# Patient Record
Sex: MALE | Race: WHITE | NOT HISPANIC OR LATINO | Employment: FULL TIME | URBAN - METROPOLITAN AREA
[De-identification: names, ages, dates, MRNs, and addresses within clinical notes are randomized per-mention and may not be internally consistent; named-entity substitution may affect disease eponyms.]

---
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
## 2020-01-07 ENCOUNTER — OFFICE VISIT - HEALTHEAST (OUTPATIENT)
Dept: FAMILY MEDICINE | Facility: CLINIC | Age: 20
End: 2020-01-07

## 2020-01-07 DIAGNOSIS — F41.1 GAD (GENERALIZED ANXIETY DISORDER): ICD-10-CM

## 2020-01-07 ASSESSMENT — ANXIETY QUESTIONNAIRES
7. FEELING AFRAID AS IF SOMETHING AWFUL MIGHT HAPPEN: SEVERAL DAYS
1. FEELING NERVOUS, ANXIOUS, OR ON EDGE: MORE THAN HALF THE DAYS
IF YOU CHECKED OFF ANY PROBLEMS ON THIS QUESTIONNAIRE, HOW DIFFICULT HAVE THESE PROBLEMS MADE IT FOR YOU TO DO YOUR WORK, TAKE CARE OF THINGS AT HOME, OR GET ALONG WITH OTHER PEOPLE: SOMEWHAT DIFFICULT
3. WORRYING TOO MUCH ABOUT DIFFERENT THINGS: MORE THAN HALF THE DAYS
2. NOT BEING ABLE TO STOP OR CONTROL WORRYING: MORE THAN HALF THE DAYS
4. TROUBLE RELAXING: MORE THAN HALF THE DAYS
5. BEING SO RESTLESS THAT IT IS HARD TO SIT STILL: MORE THAN HALF THE DAYS
6. BECOMING EASILY ANNOYED OR IRRITABLE: SEVERAL DAYS
GAD7 TOTAL SCORE: 12

## 2020-01-07 ASSESSMENT — PATIENT HEALTH QUESTIONNAIRE - PHQ9: SUM OF ALL RESPONSES TO PHQ QUESTIONS 1-9: 7

## 2020-01-20 ENCOUNTER — OFFICE VISIT - HEALTHEAST (OUTPATIENT)
Dept: FAMILY MEDICINE | Facility: CLINIC | Age: 20
End: 2020-01-20

## 2020-01-20 DIAGNOSIS — F43.23 ADJUSTMENT DISORDER WITH MIXED ANXIETY AND DEPRESSED MOOD: ICD-10-CM

## 2020-01-20 ASSESSMENT — ANXIETY QUESTIONNAIRES
7. FEELING AFRAID AS IF SOMETHING AWFUL MIGHT HAPPEN: NOT AT ALL
IF YOU CHECKED OFF ANY PROBLEMS ON THIS QUESTIONNAIRE, HOW DIFFICULT HAVE THESE PROBLEMS MADE IT FOR YOU TO DO YOUR WORK, TAKE CARE OF THINGS AT HOME, OR GET ALONG WITH OTHER PEOPLE: NOT DIFFICULT AT ALL
6. BECOMING EASILY ANNOYED OR IRRITABLE: NOT AT ALL
GAD7 TOTAL SCORE: 1
3. WORRYING TOO MUCH ABOUT DIFFERENT THINGS: NOT AT ALL
4. TROUBLE RELAXING: NOT AT ALL
5. BEING SO RESTLESS THAT IT IS HARD TO SIT STILL: NOT AT ALL
2. NOT BEING ABLE TO STOP OR CONTROL WORRYING: NOT AT ALL
1. FEELING NERVOUS, ANXIOUS, OR ON EDGE: SEVERAL DAYS

## 2020-01-20 ASSESSMENT — PATIENT HEALTH QUESTIONNAIRE - PHQ9: SUM OF ALL RESPONSES TO PHQ QUESTIONS 1-9: 1

## 2020-01-30 ENCOUNTER — COMMUNICATION - HEALTHEAST (OUTPATIENT)
Dept: FAMILY MEDICINE | Facility: CLINIC | Age: 20
End: 2020-01-30

## 2020-01-30 DIAGNOSIS — F41.1 GAD (GENERALIZED ANXIETY DISORDER): ICD-10-CM

## 2020-02-07 ENCOUNTER — COMMUNICATION - HEALTHEAST (OUTPATIENT)
Dept: FAMILY MEDICINE | Facility: CLINIC | Age: 20
End: 2020-02-07

## 2020-02-07 DIAGNOSIS — F41.1 GAD (GENERALIZED ANXIETY DISORDER): ICD-10-CM

## 2020-02-26 ENCOUNTER — COMMUNICATION - HEALTHEAST (OUTPATIENT)
Dept: FAMILY MEDICINE | Facility: CLINIC | Age: 20
End: 2020-02-26

## 2020-03-03 ENCOUNTER — OFFICE VISIT - HEALTHEAST (OUTPATIENT)
Dept: FAMILY MEDICINE | Facility: CLINIC | Age: 20
End: 2020-03-03

## 2020-03-03 DIAGNOSIS — J02.0 STREP PHARYNGITIS: ICD-10-CM

## 2020-03-03 LAB — DEPRECATED S PYO AG THROAT QL EIA: ABNORMAL

## 2020-05-11 ENCOUNTER — OFFICE VISIT - HEALTHEAST (OUTPATIENT)
Dept: FAMILY MEDICINE | Facility: CLINIC | Age: 20
End: 2020-05-11

## 2020-05-11 DIAGNOSIS — F43.22 ADJUSTMENT DISORDER WITH ANXIOUS MOOD: ICD-10-CM

## 2020-05-11 ASSESSMENT — ANXIETY QUESTIONNAIRES
3. WORRYING TOO MUCH ABOUT DIFFERENT THINGS: NOT AT ALL
1. FEELING NERVOUS, ANXIOUS, OR ON EDGE: NOT AT ALL
5. BEING SO RESTLESS THAT IT IS HARD TO SIT STILL: NOT AT ALL
7. FEELING AFRAID AS IF SOMETHING AWFUL MIGHT HAPPEN: NOT AT ALL
2. NOT BEING ABLE TO STOP OR CONTROL WORRYING: NOT AT ALL
4. TROUBLE RELAXING: NOT AT ALL
6. BECOMING EASILY ANNOYED OR IRRITABLE: NOT AT ALL
IF YOU CHECKED OFF ANY PROBLEMS ON THIS QUESTIONNAIRE, HOW DIFFICULT HAVE THESE PROBLEMS MADE IT FOR YOU TO DO YOUR WORK, TAKE CARE OF THINGS AT HOME, OR GET ALONG WITH OTHER PEOPLE: NOT DIFFICULT AT ALL
GAD7 TOTAL SCORE: 0

## 2020-05-11 ASSESSMENT — PATIENT HEALTH QUESTIONNAIRE - PHQ9: SUM OF ALL RESPONSES TO PHQ QUESTIONS 1-9: 0

## 2020-08-31 ENCOUNTER — OFFICE VISIT - HEALTHEAST (OUTPATIENT)
Dept: FAMILY MEDICINE | Facility: CLINIC | Age: 20
End: 2020-08-31

## 2020-08-31 ENCOUNTER — COMMUNICATION - HEALTHEAST (OUTPATIENT)
Dept: FAMILY MEDICINE | Facility: CLINIC | Age: 20
End: 2020-08-31

## 2020-08-31 DIAGNOSIS — Z00.00 ENCOUNTER FOR GENERAL ADULT MEDICAL EXAMINATION WITHOUT ABNORMAL FINDINGS: ICD-10-CM

## 2020-08-31 DIAGNOSIS — Z13.1 ENCOUNTER FOR SCREENING FOR DIABETES MELLITUS: ICD-10-CM

## 2020-08-31 DIAGNOSIS — Z13.220 ENCOUNTER FOR SCREENING FOR LIPOID DISORDERS: ICD-10-CM

## 2020-08-31 LAB
CHOLEST SERPL-MCNC: 179 MG/DL
FASTING STATUS PATIENT QL REPORTED: YES
FASTING STATUS PATIENT QL REPORTED: YES
GLUCOSE BLD-MCNC: 107 MG/DL (ref 70–125)
HDLC SERPL-MCNC: 50 MG/DL
HGB BLD-MCNC: 16 G/DL (ref 14–18)
LDLC SERPL CALC-MCNC: 118 MG/DL
TRIGL SERPL-MCNC: 53 MG/DL

## 2020-08-31 ASSESSMENT — MIFFLIN-ST. JEOR: SCORE: 1832.49

## 2020-11-20 ENCOUNTER — OFFICE VISIT - HEALTHEAST (OUTPATIENT)
Dept: FAMILY MEDICINE | Facility: CLINIC | Age: 20
End: 2020-11-20

## 2020-11-20 ENCOUNTER — COMMUNICATION - HEALTHEAST (OUTPATIENT)
Dept: FAMILY MEDICINE | Facility: CLINIC | Age: 20
End: 2020-11-20

## 2020-11-20 DIAGNOSIS — J02.9 ACUTE PHARYNGITIS, UNSPECIFIED ETIOLOGY: ICD-10-CM

## 2021-01-29 ENCOUNTER — OFFICE VISIT - HEALTHEAST (OUTPATIENT)
Dept: FAMILY MEDICINE | Facility: CLINIC | Age: 21
End: 2021-01-29

## 2021-01-29 DIAGNOSIS — Z11.3 SCREEN FOR STD (SEXUALLY TRANSMITTED DISEASE): ICD-10-CM

## 2021-01-29 ASSESSMENT — MIFFLIN-ST. JEOR: SCORE: 1831.69

## 2021-02-02 ENCOUNTER — COMMUNICATION - HEALTHEAST (OUTPATIENT)
Dept: FAMILY MEDICINE | Facility: CLINIC | Age: 21
End: 2021-02-02

## 2021-02-02 LAB
C TRACH DNA SPEC QL PROBE+SIG AMP: POSITIVE
N GONORRHOEA DNA SPEC QL NAA+PROBE: NEGATIVE

## 2021-05-26 ASSESSMENT — PATIENT HEALTH QUESTIONNAIRE - PHQ9: SUM OF ALL RESPONSES TO PHQ QUESTIONS 1-9: 1

## 2021-05-27 VITALS — OXYGEN SATURATION: 100 % | SYSTOLIC BLOOD PRESSURE: 118 MMHG | DIASTOLIC BLOOD PRESSURE: 55 MMHG | HEART RATE: 64 BPM

## 2021-05-27 ASSESSMENT — PATIENT HEALTH QUESTIONNAIRE - PHQ9
SUM OF ALL RESPONSES TO PHQ QUESTIONS 1-9: 0
SUM OF ALL RESPONSES TO PHQ QUESTIONS 1-9: 7

## 2021-05-28 ASSESSMENT — ANXIETY QUESTIONNAIRES
GAD7 TOTAL SCORE: 1
GAD7 TOTAL SCORE: 0
GAD7 TOTAL SCORE: 12

## 2021-05-29 NOTE — PROGRESS NOTES
Attestation:  I Shanti Lagunas DNP, performed a history and physical examination of the patient and discussed management with the NP student. I reviewed the NP student s note and agree with the documented findings and plan of care.     Assessment/Plan:        1. Viral upper respiratory tract infection  Recommend restarting OTC zyrtec  Continue using Flonase nasal spray for 2 weeks  Discussed red flags that would warrant urgent evaluation.   Discussed with patient to follow up if worsening symptoms, questions or concerns  Patient agreed and appeared pleased with plan        Subjective:    Patient ID: Greg De Leon is a 19 y.o. male.    Patient presents with nasal congestion which started 1 month ago.  Patient does have history of seasonal allergies.  Patient endorses nasal congestion and post-nasal gtt.  Denies sore throat, cough, headache, chest pain, shortness of breath, sinus pressure, nausea, diarrhea, and fever. Patient states he has not taken his zyrtec but has been using a nasal spray for the last few weeks.       The following portions of the patient's history were reviewed and updated as appropriate: allergies, current medications, past family history, past medical history, past social history, past surgical history and problem list.    Review of Systems   Constitutional: Negative.  Negative for fatigue and fever.   HENT: Positive for congestion and postnasal drip. Negative for ear pain, sinus pressure, sinus pain and sore throat.    Eyes: Negative.    Respiratory: Negative.  Negative for cough, shortness of breath and wheezing.    Cardiovascular: Negative.  Negative for chest pain.   Gastrointestinal: Negative.    Genitourinary: Negative.    Musculoskeletal: Negative.    Neurological: Negative.  Negative for headaches.   Psychiatric/Behavioral: Negative.            Current Outpatient Medications   Medication Sig Dispense Refill     calcium-vitamin D (CALCIUM-VITAMIN D) 500 mg(1,250mg) -200 unit per  tablet Take 1 tablet by mouth 2 (two) times a day with meals.       cetirizine (ZYRTEC) 10 MG tablet Take 10 mg by mouth daily.       MAGNESIUM CITRATE ORAL Take 200 mg by mouth.       multivitamin therapeutic (THERAGRAN) tablet Take 1 tablet by mouth daily.       No current facility-administered medications for this visit.          Objective:    Physical Exam   Constitutional: He is oriented to person, place, and time. He appears well-developed and well-nourished.   HENT:   Head: Normocephalic and atraumatic.   Right Ear: Hearing, tympanic membrane and ear canal normal.   Left Ear: Hearing, tympanic membrane, external ear and ear canal normal.   Nose: Mucosal edema and rhinorrhea present.   Mouth/Throat: Uvula is midline, oropharynx is clear and moist and mucous membranes are normal.   Eyes: Pupils are equal, round, and reactive to light. Conjunctivae are normal.   Neck: Normal range of motion.   Cardiovascular: Normal rate, regular rhythm, S1 normal and S2 normal.   Pulmonary/Chest: Effort normal and breath sounds normal. He has no wheezes.   Abdominal: Soft.   Musculoskeletal: Normal range of motion.   Neurological: He is alert and oriented to person, place, and time.   Skin: Skin is warm and dry.   Psychiatric: He has a normal mood and affect. His behavior is normal.         /63   Pulse (!) 54   Wt 170 lb 6 oz (77.3 kg)   SpO2 99%   BMI 25.16 kg/m

## 2021-05-30 ENCOUNTER — RECORDS - HEALTHEAST (OUTPATIENT)
Dept: ADMINISTRATIVE | Facility: CLINIC | Age: 21
End: 2021-05-30

## 2021-05-30 VITALS — BODY MASS INDEX: 24.79 KG/M2 | WEIGHT: 167.4 LBS | HEIGHT: 69 IN

## 2021-05-30 NOTE — PROGRESS NOTES
ASSESSMENT:  1. Encounter for screening for HIV  - HIV Antigen/Antibody Screening [facility]    2. Gastroesophageal reflux disease without esophagitis  - ranitidine (ZANTAC) 150 MG tablet; Take 1 tablet (150 mg total) by mouth 2 (two) times a day.  Dispense: 30 tablet; Refill: 1  I unfortunately do not have the lab test that was done for him for the STD screening.  But he did mention that it is normal.  I will recheck his HIV studies and inform him of his labs.  I did also I informed him that I think that the pain that he has on his neck as well as the upper chest appears to be reflux.  I will go ahead and have him treated with ranitidine as noted and let us know if there is no improvement or how he is doing.    PLAN:  There are no Patient Instructions on file for this visit.    Orders Placed This Encounter   Procedures     HIV Antigen/Antibody Screening [facility]     There are no discontinued medications.    No follow-ups on file.      CHIEF COMPLAINT:  Chief Complaint   Patient presents with     Lab Result Follow Up     Pain     front of neck down to chest x 1 day        HISTORY OF PRESENT ILLNESS:  Greg is a 19 y.o. male presenting to the clinic today for follow-up.  He was in [place] over the spring break, and noted having had some sexual exposures.  He had been tested for STDs around that time and was advised to have a recheck of his HIV 2 months later.  He noted having been negative in all of the other STD screening parameters.  Denied any prior STD.  He is also having some pain noted around the epigastric region with some extension into the 12th.  This was noted to have started today.  He noted not having that in the past.  Denies any prior gastroesophageal reflux.  He does smoke intermittently but not a lot.  Does not have any nausea no vomiting.    REVIEW OF SYSTEMS:   All other systems are negative.    PFSH:  Reviewed, as below.    Social History     Tobacco Use   Smoking Status Current Every Day Smoker    Smokeless Tobacco Never Used       Family History   Problem Relation Age of Onset     Breast cancer Mother      Colon cancer Paternal Grandmother        Social History     Socioeconomic History     Marital status: Single     Spouse name: Not on file     Number of children: Not on file     Years of education: Not on file     Highest education level: Not on file   Occupational History     Not on file   Social Needs     Financial resource strain: Not on file     Food insecurity:     Worry: Not on file     Inability: Not on file     Transportation needs:     Medical: Not on file     Non-medical: Not on file   Tobacco Use     Smoking status: Current Every Day Smoker     Smokeless tobacco: Never Used   Substance and Sexual Activity     Alcohol use: No     Drug use: No     Sexual activity: Never   Lifestyle     Physical activity:     Days per week: Not on file     Minutes per session: Not on file     Stress: Not on file   Relationships     Social connections:     Talks on phone: Not on file     Gets together: Not on file     Attends [employer] service: Not on file     Active member of club or organization: Not on file     Attends meetings of clubs or organizations: Not on file     Relationship status: Not on file     Intimate partner violence:     Fear of current or ex partner: Not on file     Emotionally abused: Not on file     Physically abused: Not on file     Forced sexual activity: Not on file   Other Topics Concern     Not on file   Social History Narrative    Lives with parents, older brother: Molina, and younger brother: Ismael.        No past surgical history on file.    No Known Allergies    Active Ambulatory Problems     Diagnosis Date Noted     Acne 07/17/2015     Epistaxis, recurrent 07/17/2015     Resolved Ambulatory Problems     Diagnosis Date Noted     Acute pharyngitis      Attention deficit hyperactivity disorder (ADHD)      No Additional Past Medical History       Current Outpatient Medications  "  Medication Sig Dispense Refill     calcium-vitamin D (CALCIUM-VITAMIN D) 500 mg(1,250mg) -200 unit per tablet Take 1 tablet by mouth 2 (two) times a day with meals.       cetirizine (ZYRTEC) 10 MG tablet Take 10 mg by mouth daily.       MAGNESIUM CITRATE ORAL Take 200 mg by mouth.       multivitamin therapeutic (THERAGRAN) tablet Take 1 tablet by mouth daily.       ranitidine (ZANTAC) 150 MG tablet Take 1 tablet (150 mg total) by mouth 2 (two) times a day. 30 tablet 1     No current facility-administered medications for this visit.        VITALS:  Vitals:    07/08/19 1345   BP: 108/60   Pulse: (!) 55   SpO2: 99%   Weight: 173 lb 4 oz (78.6 kg)   Height: 5' 9\" (1.753 m)     Wt Readings from Last 3 Encounters:   07/08/19 173 lb 4 oz (78.6 kg) (77 %, Z= 0.73)*   05/30/19 170 lb 6 oz (77.3 kg) (74 %, Z= 0.65)*   08/22/18 177 lb 3.2 oz (80.4 kg) (83 %, Z= 0.96)*     * Growth percentiles are based on CDC (Boys, 2-20 Years) data.     Body mass index is 25.58 kg/m .    PHYSICAL EXAM:  General Appearance: Alert, cooperative, no distress, appears stated age  HEENT: Pupils are equal and reactive, extraocular motions is normal.  Oropharynx is moist.  No notable cobblestoning.  Neck is supple no notable thyromegaly.  External ears are normal.  Lungs: Clear to auscultation bilaterally, respirations unlabored  Heart: Regular rate and rhythm, S1 and S2 normal.  Abdomen: Soft, he does not have any tenderness.  No organs were palpable.  Musculoskeletal: Normal range of motion. No joint swelling or deformity.   Neurologic:  Alert and oriented times 3.   Psychiatric: Normal mood and affect.    MEDICATIONS:  Current Outpatient Medications   Medication Sig Dispense Refill     calcium-vitamin D (CALCIUM-VITAMIN D) 500 mg(1,250mg) -200 unit per tablet Take 1 tablet by mouth 2 (two) times a day with meals.       cetirizine (ZYRTEC) 10 MG tablet Take 10 mg by mouth daily.       MAGNESIUM CITRATE ORAL Take 200 mg by mouth.       " multivitamin therapeutic (THERAGRAN) tablet Take 1 tablet by mouth daily.       ranitidine (ZANTAC) 150 MG tablet Take 1 tablet (150 mg total) by mouth 2 (two) times a day. 30 tablet 1     No current facility-administered medications for this visit.

## 2021-05-31 VITALS — BODY MASS INDEX: 26.5 KG/M2 | WEIGHT: 178.9 LBS | HEIGHT: 69 IN

## 2021-05-31 NOTE — PROGRESS NOTES
Assessment:       1. Strep pharyngitis  Rapid Strep A Screen-Throat    Group A Strep, RNA Direct Detection, Throat    Mononucleosis Screen    penicillin VK (PEN VK) 500 MG tablet     Medical Decision Making  Patient presents with throat pain most consistent with strep pharyngitis despite negative rapid strep. Also tested for suspected mononucleosis given 1 week of symptom without improvement, but monospot was negative.      Plan:       Patient placed on antibiotics.   Probiotics.  Change toothbrush.  OTC analgesics.  Salt water gargles, throat lozenges, warm tea with honey PRN.  Discussed signs of worsening symptoms and when to follow-up with PCP if no symptom improvement.      Patient Instructions   Your rapid strep test was positive today. We will treat with a course of antibiotics. Please complete the full course of antibiotics. You can take your medication with food and with a probiotic such as Culturelle to prevent stomach irritation. You will be contagious for 24 hours following initiation of the medication.    You may use Tylenol or Motrin for pain and fevers.    May drink warm tea, gargle saline solution, or use throat lozenges to sooth throat pain.    Change toothbrush after 48 hours of starting the antibiotic to prevent reinfection.    Watch for resolution of symptoms in the next few days. If you continue to have high fevers, begin to have difficulty swallowing or breathing, notice worsening neck pain, or difficulty moving neck, please return to clinic or present to the emergency room immediately. Otherwise, follow up with your primary care provider as needed.      Subjective:        History was provided by the patient and mother.  Greg De Leon is a 19 y.o. male who presents for evaluation of a sore throat. Associated symptoms include fatigue and headaches. Onset of symptoms was 1 week ago, unchanged since that time.  He is drinking plenty of fluids. He has not had recent close exposure to someone with  proven streptococcal pharyngitis. Patient denies cough, vomiting, an diarrhea. Takes ibuprofen which helps with the headaches.    The following portions of the patient's history were reviewed and updated as appropriate: allergies, current medications and problem list.    Review of Systems  Pertinent items are noted in HPI.    Allergies  No Known Allergies      Objective:       /65 (Patient Site: Right Arm, Patient Position: Sitting, Cuff Size: Adult Regular)   Pulse 82   Temp 98.7  F (37.1  C) (Oral)   Resp 12   Wt 181 lb 14.4 oz (82.5 kg)   SpO2 97%   BMI 26.86 kg/m    General appearance: alert, appears stated age, cooperative, no distress and non-toxic  Head: Normocephalic, without obvious abnormality, atraumatic  Ears: TM's intact with mild mucoid fluid and bulginb, no eryhema; external ears normal  Nose: no discharge  Throat: moderate tonsils welling with severe erythema, no exudate; MMM, lips and tongue normal  Neck: mild anterior cervical adenopathy and supple, symmetrical, trachea midline  Lungs: clear to auscultation bilaterally and no rhonchi, rales, or wheezing  Heart: regular rate and rhythm, S1, S2 normal, no murmur, click, rub or gallop    Lab Results    Recent Results (from the past 24 hour(s))   Rapid Strep A Screen-Throat   Result Value Ref Range    Rapid Strep A Antigen No Group A Strep detected, presumptive negative No Group A Strep detected, presumptive negative   Mononucleosis Screen   Result Value Ref Range    Mono Screen Negative Negative     I personally reviewed these results and discussed findings with the patient.

## 2021-05-31 NOTE — PATIENT INSTRUCTIONS - HE
Your rapid strep test was positive today. We will treat with a course of antibiotics. Please complete the full course of antibiotics. You can take your medication with food and with a probiotic such as Culturelle to prevent stomach irritation. You will be contagious for 24 hours following initiation of the medication.    You may use Tylenol or Motrin for pain and fevers.    May drink warm tea, gargle saline solution, or use throat lozenges to sooth throat pain.    Change toothbrush after 48 hours of starting the antibiotic to prevent reinfection.    Watch for resolution of symptoms in the next few days. If you continue to have high fevers, begin to have difficulty swallowing or breathing, notice worsening neck pain, or difficulty moving neck, please return to clinic or present to the emergency room immediately. Otherwise, follow up with your primary care provider as needed.

## 2021-06-01 VITALS — HEIGHT: 69 IN | WEIGHT: 177.2 LBS | BODY MASS INDEX: 26.25 KG/M2

## 2021-06-03 VITALS — HEIGHT: 69 IN | WEIGHT: 182.19 LBS | BODY MASS INDEX: 26.98 KG/M2

## 2021-06-03 VITALS — WEIGHT: 173.25 LBS | HEIGHT: 69 IN | BODY MASS INDEX: 25.66 KG/M2

## 2021-06-03 VITALS — WEIGHT: 181.9 LBS | BODY MASS INDEX: 26.86 KG/M2

## 2021-06-03 VITALS — WEIGHT: 170.38 LBS | BODY MASS INDEX: 25.16 KG/M2

## 2021-06-04 VITALS — WEIGHT: 175 LBS | BODY MASS INDEX: 25.66 KG/M2

## 2021-06-04 VITALS
DIASTOLIC BLOOD PRESSURE: 72 MMHG | BODY MASS INDEX: 26.01 KG/M2 | HEART RATE: 62 BPM | WEIGHT: 177.44 LBS | OXYGEN SATURATION: 98 % | SYSTOLIC BLOOD PRESSURE: 126 MMHG

## 2021-06-04 VITALS
TEMPERATURE: 97.9 F | OXYGEN SATURATION: 97 % | BODY MASS INDEX: 25.51 KG/M2 | DIASTOLIC BLOOD PRESSURE: 64 MMHG | WEIGHT: 174 LBS | SYSTOLIC BLOOD PRESSURE: 114 MMHG | HEART RATE: 74 BPM

## 2021-06-04 VITALS
DIASTOLIC BLOOD PRESSURE: 74 MMHG | SYSTOLIC BLOOD PRESSURE: 124 MMHG | OXYGEN SATURATION: 99 % | BODY MASS INDEX: 26.17 KG/M2 | WEIGHT: 182.8 LBS | HEART RATE: 71 BPM | HEIGHT: 70 IN

## 2021-06-05 VITALS
OXYGEN SATURATION: 99 % | HEIGHT: 70 IN | WEIGHT: 180 LBS | HEART RATE: 59 BPM | DIASTOLIC BLOOD PRESSURE: 75 MMHG | BODY MASS INDEX: 25.77 KG/M2 | SYSTOLIC BLOOD PRESSURE: 133 MMHG | TEMPERATURE: 97.8 F

## 2021-06-05 NOTE — PROGRESS NOTES
Assessment:   1. SYDNEY (generalized anxiety disorder)  Discussed diagnosis with patient and his mother and possible treatment options. Recommend initiating treatment with SSRI and answered all questions.   - sertraline (ZOLOFT) 25 MG tablet; Take 1 tablet (25 mg total) by mouth daily.  Dispense: 7 tablet; Refill: 0  - sertraline (ZOLOFT) 50 MG tablet; Take 1 tablet (50 mg total) by mouth daily.  Dispense: 30 tablet; Refill: 0    Plan:   Medications: Zoloft.  Labs: no labs indicated at this time.  Recommended counseling.  Handouts describing disease, natural history, and treatment were not given to the patient.  Reviewed concept of anxiety as biochemical imbalance of neurotransmitters and rationale for treatment.  Instructed patient to contact office or on-call physician promptly should condition worsen or any new symptoms appear and provided on-call telephone numbers. IF THE PATIENT HAS ANY SUICIDAL OR HOMICIDAL IDEATIONS, CALL THE OFFICE, DISCUSS WITH A SUPPORT MEMBER, OR GO TO THE ER IMMEDIATELY. Patient was agreeable with this plan.  Follow up: 2 weeks.  Spent 25 minutes (>50% of visit) discussing the risks of anxiety disorder, the  pathophysiology, etiology, risks, and principles of treatment.     Subjective:   Greg De Leon is a 19 y.o. male who presents for new evaluation and treatment of anxiety disorder. He has the following anxiety symptoms: chest pain, difficulty concentrating, insomnia, panic attacks and racing thoughts. Onset of symptoms was approximately 3 months ago. Symptoms have been gradually worsening since that time. He denies current suicidal and homicidal ideation. Family history significant for anxiety and depression. Risk factors: positive family history in  brother(s) and mother. Previous treatment includes none.     The following portions of the patient's history were reviewed and updated as appropriate: allergies, current medications, past family history, past medical history, past social  history, past surgical history and problem list.    Review of Systems  A 12 point comprehensive review of systems was negative except as noted.      Objective:   /55   Pulse 64   SpO2 100%   General appearance: alert, appears stated age and cooperative  Head: Normocephalic, without obvious abnormality, atraumatic  Pulses: 2+ and symmetric  Skin: Skin color, texture, turgor normal. No rashes or lesions  Lymph nodes: Cervical, supraclavicular, and axillary nodes normal.  Neurologic: Grossly normal

## 2021-06-05 NOTE — TELEPHONE ENCOUNTER
RN cannot approve Refill Request    RN can NOT refill this medication Protocol failed and NO refill given and medication not on med list.       Betty Blanco, [facility] Triage/Med Refill 1/30/2020    Requested Prescriptions   Pending Prescriptions Disp Refills     sertraline (ZOLOFT) 50 MG tablet [Pharmacy Med Name: SERTRALINE HCL 50 MG TABLET] 30 tablet 0     Sig: TAKE 1 TABLET BY MOUTH EVERY DAY (START AFTER 7 DAYS OF 25 MG DAILY)       SSRI Refill Protocol  Failed - 1/30/2020  8:33 AM        Failed - Age 21 and younger route to prescribing provider     Last office visit with prescriber/PCP: 1/20/2020 Shanti Lagunas, [facility] same dept: 1/20/2020 Shanti Lagunas, [facility] OR same specialty: 1/20/2020 Shanti Lagunas, [facility]  Last physical: 8/22/2019 Last MTM visit: Visit date not found   Next visit within 3 mo: Visit date not found  Next physical within 3 mo: Visit date not found  Prescriber OR PCP: PENELOPE Miner  Last diagnosis associated with med order: 1. SYDNEY (generalized anxiety disorder)  - sertraline (ZOLOFT) 50 MG tablet [Pharmacy Med Name: SERTRALINE HCL 50 MG TABLET]; TAKE 1 TABLET BY MOUTH EVERY DAY (START AFTER 7 DAYS OF 25 MG DAILY)  Dispense: 30 tablet; Refill: 0    If protocol passes may refill for 12 months if within 3 months of last provider visit (or a total of 15 months).             Passed - PCP or prescribing provider visit in last year     Last office visit with prescriber/PCP: 1/20/2020 Shanti Lagunas, [facility] same dept: 1/20/2020 Shanti Lagunas, [facility] OR same specialty: 1/20/2020 Shanti Lagunas, [facility]  Last physical: 8/22/2019 Last MTM visit: Visit date not found   Next visit within 3 mo: Visit date not found  Next physical within 3 mo: Visit date not found  Prescriber OR PCP: PENELOPE Miner  Last diagnosis associated with med order: 1. SYDNEY (generalized anxiety disorder)  - sertraline (ZOLOFT) 50 MG tablet [Pharmacy Med Name: SERTRALINE HCL 50 MG TABLET]; TAKE 1  TABLET BY MOUTH EVERY DAY (START AFTER 7 DAYS OF 25 MG DAILY)  Dispense: 30 tablet; Refill: 0    If protocol passes may refill for 12 months if within 3 months of last provider visit (or a total of 15 months).

## 2021-06-05 NOTE — PROGRESS NOTES
Assessment:   1. Adjustment disorder with mixed anxiety and depressed mood  Stable with current medication. Continue on Zoloft 25 mg daily       Plan:   Medications: Zoloft.  Reviewed concept of anxiety as biochemical imbalance of neurotransmitters and rationale for treatment.  Instructed patient to contact office or on-call physician promptly should condition worsen or any new symptoms appear and provided on-call telephone numbers. IF THE PATIENT HAS ANY SUICIDAL OR HOMICIDAL IDEATIONS, CALL THE OFFICE, DISCUSS WITH A SUPPORT MEMBER, OR GO TO THE ER IMMEDIATELY. Patient was agreeable with this plan.  Follow up: 4 weeks.  Spent 15 minutes (>50% of visit) discussing the risks of anxiety disorder, the  pathophysiology, etiology, risks, and principles of treatment.     Subjective:   Greg De Leon is a 19 y.o. male who presents for follow up of depression with his mother. He reports that he is doing much better with the medication. He denied any adverse effect. He is currently taking 25 mg of zoloft. Symptoms have been stable since that time. Patient denies anhedonia, depressed mood, difficulty concentrating, hopelessness, hypersomnia, insomnia, psychomotor agitation, recurrent thoughts of death, suicidal attempt, suicidal thoughts with specific plan, suicidal thoughts without plan, weight gain and weight loss. He denies side effects from the treatment:    The following portions of the patient's history were reviewed and updated as appropriate: allergies, current medications, past family history, past medical history, past social history, past surgical history and problem list.    Review of Systems  A 12 point comprehensive review of systems was negative except as noted.      Objective:      /72   Pulse 62   Wt 177 lb 7 oz (80.5 kg)   SpO2 98%   BMI 26.01 kg/m     General:  alert, appears stated age and cooperative   Affect & Behavior:  full facial expressions, good grooming, good insight, normal  perception, normal reasoning, normal speech pattern and content and normal thought patterns

## 2021-06-05 NOTE — TELEPHONE ENCOUNTER
Medication Question or Clarification  Who is calling: Patient's parent  What medication are you calling about (include dose and sig)?:   sertraline (ZOLOFT) 50 MG tablet  Sig - Route: Take 1 tablet (50 mg total) by mouth daily. - Oral    Sent to pharmacy as: sertraline 50 mg tablet (ZOLOFT)        Who prescribed the medication?: PCP  What is your question/concern?: Patient's parent stated the last time the patient was in, they were with and the provider told the patient's parent to let the provider know if the patient wants the 50MG tablets or the 25MG, and the patient would like to have the 25MG tablets. Parent requests that the medication be sent to the pharmacy, thank you.  Requested Pharmacy: [facility] on [facility] in [facility] to leave a detailed message?: Yes

## 2021-06-06 NOTE — PROGRESS NOTES
Assessment/Plan:        Diagnoses and all orders for this visit:    Strep pharyngitis  -     Rapid Strep A Screen-Throat  -     penicillin G benzathine injection 1.2 Million Units (BICILLIN-LA)  He does have a positive strep rapid test today.  Discussed different options of management with him and mother.  I think that because he will be traveling in 2days I did offer them to use the Bicillin injection.  This will be given and he will only have to take ibuprofen or Tylenol.  Encouraged to make sure to be well-hydrated.  Should continue to take ibuprofen or Tylenol.        Subjective:    Patient ID: Greg De Leon is a 19 y.o. male.    Sore Throat    This is a new problem. The current episode started in the past 7 days. The problem has been gradually worsening. The pain is mild. Associated symptoms include congestion, coughing, headaches and a hoarse voice. Pertinent negatives include no abdominal pain, ear pain, trouble swallowing or vomiting. He has had no exposure to strep or mono. Exposure to: He was exposed to 1 of his friends who had approximately tract infection and lung infection.. He has tried NSAIDs for the symptoms. The treatment provided mild relief.   He will be traveling to [place] for a sporting event which he will be a participant in.    The following portions of the patient's history were reviewed and updated as appropriate: allergies, current medications, past family history, past medical history, past social history, past surgical history and problem list.    Review of Systems   Constitutional: Negative for chills, fatigue and fever.   HENT: Positive for congestion, hoarse voice, rhinorrhea and sore throat. Negative for ear pain, postnasal drip, sinus pressure, sinus pain and trouble swallowing.    Respiratory: Positive for cough. Negative for chest tightness and wheezing.    Gastrointestinal: Negative for abdominal pain and vomiting.   Neurological: Positive for headaches. Negative for  light-headedness.     Vitals:    03/03/20 1653   BP: 114/64   Pulse: 74   Temp: 97.9  F (36.6  C)   TempSrc: Oral   SpO2: 97%   Weight: 174 lb (78.9 kg)             Objective:    Physical Exam   Constitutional: He appears well-developed and well-nourished. No distress.   He is warm to touch and not pale.  He is in no distress.   HENT:   Right Ear: Tympanic membrane is injected.   Left Ear: Tympanic membrane is injected.   Nose: Rhinorrhea present. Right sinus exhibits no maxillary sinus tenderness and no frontal sinus tenderness. Left sinus exhibits no maxillary sinus tenderness and no frontal sinus tenderness.   Mouth/Throat: Oropharyngeal exudate and posterior oropharyngeal edema present. No posterior oropharyngeal erythema or tonsillar abscesses.   He does have a mild enlargement noted on the right tonsil.   Cardiovascular: Normal rate and regular rhythm.   Pulmonary/Chest: Effort normal and breath sounds normal.   Lymphadenopathy:     He has no cervical adenopathy.   Neurological: He is alert.

## 2021-06-08 NOTE — PROGRESS NOTES
"Greg De Leon is a 20 y.o. male who is being evaluated via a billable video visit.      The patient has been notified of following:     \"This video visit will be conducted via a call between you and your physician/provider. We have found that certain health care needs can be provided without the need for an in-person physical exam.  This service lets us provide the care you need with a video conversation.  If a prescription is necessary we can send it directly to your pharmacy.  If lab work is needed we can place an order for that and you can then stop by our lab to have the test done at a later time.    Video visits are billed at different rates depending on your insurance coverage. Please reach out to your insurance provider with any questions.    If during the course of the call the physician/provider feels a video visit is not appropriate, you will not be charged for this service.\"    Patient has given verbal consent to a Video visit? Yes    Patient would like to receive their AVS by AVS Preference: Abbie.    Patient would like the video invitation sent by: Text to cell phone: .    Will anyone else be joining your video visit? No        Video Start Time: 11:30 AM  Assessment/Plan:  1. Adjustment disorder with anxious mood  Discussed how to wean off current medication. Also informed patient that increase things changes, he should contact the clinic on how to restart his medication. Patient verbalized understanding.     Subjective:  Patient reports that he is doing well and is has been taking his zoloft every other day and he has not noticed any difference. He states that he will like to stop the medication. He has been schooling from home due to the pandemic. He denied any new concerns.     Additional provider notes: GENERAL: Healthy, alert and no distress  EYES: Eyes grossly normal to inspection. No discharge or erythema, or obvious scleral/conjunctival abnormalities.  RESP: No audible wheeze, cough, or " visible cyanosis.  No visible retractions or increased work of breathing.    NEURO: Cranial nerves grossly intact. Mentation and speech appropriate for age.  PSYCH: Mentation appears normal, affect normal/bright, judgement and insight intact, normal speech and appearance well-groomed      Video-Visit Details    Type of service:  Video Visit    Video End Time (time video stopped): 11:55 AM  Originating Location (pt. Location): Home    Distant Location (provider location):  [facility]/OB     Platform used for Video Visit: PENELOPE Villanueva

## 2021-06-08 NOTE — PROGRESS NOTES
"  Greg presents with his mother for:   Chief Complaint   Patient presents with     Cough     x 3 days     Fever     x 3 days. Temp was 100.1     Nasal Congestion       History of Present Illness: Greg De Leon is a 16 y.o. male who is here today for fever.     He has been sick for 3 days, since Friday 1/27/17.  He has a runny nose and a dry cough.  He has very low energy.  No body aches.  No rash. He is in [place].  He is not eating normally.  No emesis or diarrhea.  His father and brother have cough and cold symptoms.  He is otherwise healthy and does not have asthma.  He had a possibly temp to 100.1 at home this morning, but has no fever here in clinic without treatment.         Allergies:  No Known Allergies    Medications:  No current outpatient prescriptions on file prior to visit.     No current facility-administered medications on file prior to visit.        Past Medical History:  Patient Active Problem List   Diagnosis     Acne     Epistaxis, recurrent     No past surgical history on file.    Examination:    Vitals:    01/30/17 0914   Pulse: 65   Temp: 98.5  F (36.9  C)   TempSrc: Oral   SpO2: 98%   Weight: 167 lb 6.4 oz (75.9 kg)   Height: 5' 8.5\" (1.74 m)       General appearance: Alert, well nourished, in no distress.  Eye Exam: PERRL, EOMI, no erythema, no discharge.  Ear Exam: Canal is clear on the right and left.  The tympanic membrane is clear on the right and left.   Nose Exam: no discharge.  Oropharynx Exam: no erythema, no exudates.   Lymph: Enlarged submandibular gland on the right. No lymphadenopathy appreciated in anterior chain, no lymphadenopathy in the posterior cervical chain, none in the supraclavicular region.    Cardiovascular Exam: RRR without murmurs rubs or gallops. Normal S1 and S2  Lung Exam: Clear to auscultation, no rhonchi, no wheezing, and no rales.  No increased work of breathing.  Abdomen Exam: Soft, non tender, non distended.  Bowel sounds present.  No masses or " hepatosplenomegaly  Skin Exam: Skin color, texture, turgor appropriate. No rashes or lesions.    Data:  Results for orders placed or performed in visit on 01/30/17   Rapid Strep A Screen-Throat   Result Value Ref Range    Rapid Strep A Antigen Group A Strep detected (!) No Group A Strep detected       Assessment/Plan:      ICD-10-CM    1. Fever, unspecified fever cause R50.9 Rapid Strep A Screen-Throat   2. Strep throat J02.0 amoxicillin (AMOXIL) 500 MG tablet         Patient Instructions   Strep Throat   Your rapid strep test was positive today.     Strep throat is an infection caused by a bacteria called Streptococci.     It is important to treat strep throat with antibiotics to prevent some rare but serious complications such as rheumatic fever (a disease that affects the heart).        Antibiotics: Symptoms and fever should resolve within 48 hours on the antibiotic.  Your child should take the medicine until completion even if they are feeling better.  Consider the addition of yogurt or a probiotic a couple times a day to help reduce the risk of diarrhea as a side effect of the antibiotic.       Fever and pain relief :  It is OK to give your child acetaminophen (Tylenol) or ibuprofen (Advil) for throat pain or fever as needed.      Contagiousness:  Your child is no longer contagious after he has taken the antibiotic for 24 hours. They can return to school after one day if he is feeling better and the fever is gone. Hand washing and avoiding sharing a cup are the best way to prevent the spread of strep throat. Change out their toothbrush after 48 hours on the antibiotic to prevent the spread to others.       Strep tests for the family:   Any child or adult who lives in your home and has a fever, sore throat, headache, or vomiting in the next 5 days should be brought in for a Strep test.     Contact us if their fever and symptoms have not resolved after 48 hours, or with any concerns.     Thank you for coming in  today!                    Ratna Chacon 1/30/2017 9:21 AM  Pediatrician  [facility] [phone]

## 2021-06-11 NOTE — TELEPHONE ENCOUNTER
Patient informed that he is not due for a Tdap until May, 2021 even with his laceration. Per Shanti Lagunas, FNP

## 2021-06-11 NOTE — TELEPHONE ENCOUNTER
Who is calling:  Dannielle, patient's Mother  Reason for Call:  Patient has an appointment today for a physical.  Mom asks that his chart be reviewed for any lab lab work patient should have.  Also, Dannielle notes that Greg did have a laceration on his leg this past July when he fell guiding a boat onto a trailer.  He cut his leg on the dock footing.  The laceration did not need medical attention.   Mom is asking if he should have a tetanus booster at this time?    Date of last appointment with primary care: 5/11/20  Okay to leave a detailed message: No need to call Mom back.  Please address her question with Greg at this appointment.

## 2021-06-13 NOTE — TELEPHONE ENCOUNTER
Symptom  Describe your symptoms: possible sore throat  Any pain: n/a  New/Ongoing: n/a  How long have you been having symptoms: n/a    Have you been seen for this:  No  Appointment offered?: Patient declines  Triage offered?: No, caller not with patient  Home remedies tried: n.a  Requested Pharmacy: n/a  Okay to leave a detailed message? Yes  [phone] - patient phone number.   Caller stated to please call patient to go over symptoms, caller stated that patient just said it was sore throat problems and wanted to get tested for strep.

## 2021-06-13 NOTE — PROGRESS NOTES
"Greg De Leon is a 20 y.o. male who is being evaluated via a billable video visit.      The patient has been notified of following:     \"This video visit will be conducted via a call between you and your physician/provider. We have found that certain health care needs can be provided without the need for an in-person physical exam.  This service lets us provide the care you need with a video conversation.  If a prescription is necessary we can send it directly to your pharmacy.  If lab work is needed we can place an order for that and you can then stop by our lab to have the test done at a later time.    Video visits are billed at different rates depending on your insurance coverage. Please reach out to your insurance provider with any questions.    If during the course of the call the physician/provider feels a video visit is not appropriate, you will not be charged for this service.\"    Patient has given verbal consent to a Video visit? Yes  How would you like to obtain your AVS? AVS Preference: MyChart.  If dropped by the video visit, the video invitation should be sent to: Text to cell phone: [phone]  Will anyone else be joining your video visit? No    Video Start Time: 4:55pm    Additional provider notes:    Subjective     Greg De Leon is a 20 y.o. male who presents for video visit today for the following health issues:    HPI   Sore throat -patient reports that he had awoken this morning with a severe sore throat, and swollen glands in the neck.  He had otherwise been feeling well up until this morning.  He denies any associated fevers or chills, cough, body aches, changes in his sense of taste or smell, vomiting or diarrhea.  Patient reports a history of somewhat frequent group A strep throat infections.  He also notes that his girlfriend, with whom he had been recently had also noted onset of a sore throat this morning.  When he has looked at the back of his throat in the mirror he notes that the " tonsils appear to be swollen and red and there seems to be a puslike drainage present as well.  He reports that he is able to take p.o. liquids and solids without difficulty.    Patient Active Problem List   Diagnosis     Acne     Epistaxis, recurrent     History reviewed. No pertinent surgical history.    Social History     Tobacco Use     Smoking status: Former Smoker     Smokeless tobacco: Current User     Tobacco comment: Uses a vaporizer   Substance Use Topics     Alcohol use: No     Family History   Problem Relation Age of Onset     Breast cancer Mother      Colon cancer Paternal Grandmother          Current Outpatient Medications   Medication Sig Dispense Refill     calcium-vitamin D (CALCIUM-VITAMIN D) 500 mg(1,250mg) -200 unit per tablet Take 1 tablet by mouth 2 (two) times a day with meals.       MAGNESIUM CITRATE ORAL Take 200 mg by mouth.       multivitamin therapeutic (THERAGRAN) tablet Take 1 tablet by mouth daily.       cetirizine (ZYRTEC) 10 MG tablet Take 10 mg by mouth daily.       No current facility-administered medications for this visit.      No Known Allergies      Review of Systems   Negative except as noted above in HPI.       Objective    Physical Exam   Gen: Alert/oriented to person/place.  Voice is of normal tone and volume.  Examination of patient's oropharynx with camera shows diffusely erythematous, swollen 2+ tonsils with presence of exudate.     Assessment & Plan     Problem List Items Addressed This Visit     None      Visit Diagnoses     Acute pharyngitis, unspecified etiology    -  Primary    Relevant Medications    amoxicillin (AMOXIL) 500 MG capsule        Patient with symptoms highly suggestive of strep pharyngitis.  Will treat empirically rather than require patient to come in to the clinic for throat swab, due to the potential risk involved given high prevalence of COVID-19 in the community.     Patient is advised that if sore throat is not responding to treatment with the  amoxicillin within 48 hours or if you should develop associated symptoms that would be suggestive of a COVID-19 infection, that he be tested for COVID-19 as soon as able either at one of our [facility] testing locations or at a community testing location.  Patient is agreeable to this plan.     Video-Visit Details    Type of service:  Video Visit    Video End Time (time video stopped): 5:03  Originating Location (pt. Location): Home    Distant Location (provider location):  [facility]     Platform used for Video Visit: Pat Gilliam MD

## 2021-06-13 NOTE — PROGRESS NOTES
Assessment:   1. Encounter to establish care  2. Annual physical exam  Healthy male exam.      Plan:   All questions answered.  Testicular self exam technique reviewed and patient encouraged to perform self-exam monthly.  Follow up as needed.     Subjective:   Greg De Leon is a 17 y.o. male who presents for an annual exam with his mother. The patient reports that there is not domestic violence in his life. Patient is his last year in high school and plan to go to college for business studies. He has no concerns today.    Healthy Habits:   Regular Exercise: Yes  Sunscreen Use: Yes  Healthy Diet: Yes  Dental Visits Regularly: Yes  Seat Belt: Yes  Sexually active: No  Monthly Self Testicular Exams:  No  Hemoccults: N/A  Flex Sig: N/A  Colonoscopy: N/A  Lipid Profile: N/A  Glucose Screen: N/A  Prevention of Osteoporosis: N/A  Last Dexa: N/A  Guns at Home:  Yes  Guns Safety Locks:  Yes      Immunization History   Administered Date(s) Administered     DTaP, historic 2000, 2000, 2000, 07/10/2001, 05/17/2005     HPV Quadrivalent 08/20/2013, 10/21/2013, 02/25/2014     Hep A, historic 07/30/2007, 03/07/2008     Hep B, historic 2000, 2000, 07/10/2001     HiB, historic 2000, 2000, 07/10/2001     IPV 2000, 2000, 2000, 05/17/2005     Influenza, Seasonal, Inj PF 11/06/2010, 10/21/2011, 10/21/2013     Influenza, inj, historic 01/23/2004, 02/24/2004, 11/10/2006, 10/10/2007, 10/18/2008, 12/12/2009     Influenza, seasonal,quad inj 6-35 mos 10/18/2014     Influenza,seasonal quad, PF, 36+MOS 08/24/2016     MMR 04/24/2001, 05/17/2005     Meningococcal MCV4P 05/16/2011, 08/24/2016     Pneumo Conj 7-V(before 2010) 2000, 2000, 2000, 04/24/2001     Tdap 05/16/2011     Varicella 04/24/2001, 07/30/2007     Immunization status: up to date and documented, stated as current, but no records available.    No exam data present    Current Outpatient Prescriptions  "  Medication Sig Dispense Refill     calcium-vitamin D (CALCIUM-VITAMIN D) 500 mg(1,250mg) -200 unit per tablet Take 1 tablet by mouth 2 (two) times a day with meals.       cetirizine (ZYRTEC) 10 MG tablet Take 10 mg by mouth daily.       multivitamin therapeutic (THERAGRAN) tablet Take 1 tablet by mouth daily.       No current facility-administered medications for this visit.      No past medical history on file.  No past surgical history on file.  Review of patient's allergies indicates no known allergies.  No family history on file.  Social History     Social History     Marital status: Single     Spouse name: N/A     Number of children: N/A     Years of education: N/A     Occupational History     Not on file.     Social History Main Topics     Smoking status: Never Smoker     Smokeless tobacco: Not on file     Alcohol use Not on file     Drug use: Not on file     Sexual activity: Not on file     Other Topics Concern     Not on file     Social History Narrative    Lives with parents, older brother: Molina, and younger brother: Ismael.        Review of Systems  General:  Denies problem  Eyes: Denies problem  Ears/Nose/Throat: Denies problem  Cardiovascular: Denies problem  Respiratory:  Denies problem  Gastrointestinal:  Denies problem  Genitourinary: Denies problem  Musculoskeletal:  Denies problem  Skin: Denies problem  Neurologic: Denies problem  Psychiatric: Denies problem  Endocrine: Denies problem  Heme/Lymphatic: Denies problem   Allergic/Immunologic: Denies problem        Objective:     Vitals:    10/09/17 1559   BP: 110/62   Pulse: 77   SpO2: 98%   Weight: 178 lb 14.4 oz (81.1 kg)   Height: 5' 8.5\" (1.74 m)     Body mass index is 26.81 kg/(m^2).    Physical  General Appearance: Alert, cooperative, no distress, appears stated age  Head: Normocephalic, without obvious abnormality, atraumatic  Eyes: PERRL, conjunctiva/corneas clear, EOM's intact  Ears: Normal TM's and external ear canals, both ears  Nose: Nares " normal, septum midline,mucosa normal, no drainage  Throat: Lips, mucosa, and tongue normal; teeth and gums normal  Neck: Supple, symmetrical, trachea midline, no adenopathy;  thyroid: not enlarged, symmetric, no tenderness/mass/nodules; no carotid bruit or JVD  Back: Symmetric, no curvature, ROM normal, no CVA tenderness  Lungs: Clear to auscultation bilaterally, respirations unlabored  Heart: Regular rate and rhythm, S1 and S2 normal, no murmur, rub, or gallop,  Abdomen: Soft, non-tender, bowel sounds active all four quadrants,  no masses, no organomegaly  Genitourinary: Penis normal. Right testis is descended. Left testis is descended.   Musculoskeletal: Normal range of motion. No joint swelling or deformity.   Extremities: Extremities normal, atraumatic, no cyanosis or edema  Skin: Skin color, texture, turgor normal, no rashes but mild acne noted  Lymph nodes: Cervical, supraclavicular, and axillary nodes normal  Neurologic: He is alert. He has normal reflexes.   Psychiatric: He has a normal mood and affect.

## 2021-06-14 NOTE — PROGRESS NOTES
ASSESSMENT:  1. Screen for STD (sexually transmitted disease)  Patient with likely exposure to chlamydia, currently asymptomatic.  - Chlamydia trachomatis & Neisseria gonorrhoeae, Amplified Detection  - doxycycline (MONODOX) 100 MG capsule; Take 1 capsule (100 mg total) by mouth 2 (two) times a day for 7 days.  Dispense: 14 capsule; Refill: 0        PLAN:  1.  Screen for chlamydia and gonorrhea.  2.  Meanwhile, empiric treatment with doxycycline 100 mg twice daily x7 days.      Orders Placed This Encounter   Procedures     Chlamydia trachomatis & Neisseria gonorrhoeae, Amplified Detection     Order Specific Question:   Specimen Source?     Answer:   Urine     There are no discontinued medications.    No follow-ups on file.    CHIEF COMPLAINT:  Chief Complaint   Patient presents with     STD screen       SUBJECTIVE:  Greg is a 20 y.o. male patient comes in stating that his girlfriend was just diagnosed with chlamydia 2 days ago she was having some pelvic discomfort.  He himself has not had any symptoms whatsoever, he does not have a history of any STDs, explained to the patient that we will check his urine for chlamydia and gonorrhea however given his likelihood of exposure since they are sexually active I am going to empirically treat him with an antibiotic.    Patient reports that his girlfriend will screen for other STDs and they apparently were negative.    Patient believes that his girlfriend is being treated, though he does not know what agent is being used.    No other change in his health status recently overall he is feeling well.    REVIEW OF SYSTEMS:      All other systems are negative.    PFSH:  Immunization History   Administered Date(s) Administered     DTaP, historic 2000, 2000, 2000, 07/10/2001, 05/17/2005     HPV Quadrivalent 08/20/2013, 10/21/2013, 02/25/2014     Hep A, historic 07/30/2007, 03/07/2008     Hep B, historic 2000, 2000, 07/10/2001     HiB,  historic,unspecified 2000, 2000, 07/10/2001     INFLUENZA,SEASONAL QUAD, PF, =/> 6months 08/24/2016, 10/07/2018, 09/25/2019     IPV 2000, 2000, 2000, 05/17/2005     Influenza, Seasonal, Inj PF IIV3 11/06/2010, 10/21/2011, 10/21/2013     Influenza, inj, historic,unspecified 01/23/2004, 02/24/2004, 11/10/2006, 10/10/2007, 10/18/2008, 12/12/2009, 09/25/2019     Influenza, seasonal,quad inj 6-35 mos 10/18/2014     MMR 04/24/2001, 05/17/2005     Meningococcal MCV4P 05/16/2011, 08/24/2016     Pneumo Conj 7-V(before 2010) 2000, 2000, 2000, 04/24/2001     Tdap 05/16/2011     Varicella 04/24/2001, 07/30/2007     Social History     Socioeconomic History     Marital status: Single     Spouse name: Not on file     Number of children: Not on file     Years of education: Not on file     Highest education level: Not on file   Occupational History     Not on file   Social Needs     Financial resource strain: Not on file     Food insecurity     Worry: Not on file     Inability: Not on file     Transportation needs     Medical: Not on file     Non-medical: Not on file   Tobacco Use     Smoking status: Former Smoker     Smokeless tobacco: Current User     Tobacco comment: Uses a vaporizer   Substance and Sexual Activity     Alcohol use: No     Drug use: Yes     Types: Marijuana     Sexual activity: Yes     Partners: Female   Lifestyle     Physical activity     Days per week: Not on file     Minutes per session: Not on file     Stress: Not on file   Relationships     Social connections     Talks on phone: Not on file     Gets together: Not on file     Attends Faith service: Not on file     Active member of club or organization: Not on file     Attends meetings of clubs or organizations: Not on file     Relationship status: Not on file     Intimate partner violence     Fear of current or ex partner: Not on file     Emotionally abused: Not on file     Physically abused: Not on file      "Forced sexual activity: Not on file   Other Topics Concern     Not on file   Social History Narrative    Lives with parents, older brother: Molina, and younger brother: Ismael.      No past medical history on file.  Family History   Problem Relation Age of Onset     Breast cancer Mother      Colon cancer Paternal Grandmother        MEDICATIONS:  Current Outpatient Medications   Medication Sig Dispense Refill     calcium-vitamin D (CALCIUM-VITAMIN D) 500 mg(1,250mg) -200 unit per tablet Take 1 tablet by mouth 2 (two) times a day with meals.       cetirizine (ZYRTEC) 10 MG tablet Take 10 mg by mouth daily.       MAGNESIUM CITRATE ORAL Take 200 mg by mouth.       multivitamin therapeutic (THERAGRAN) tablet Take 1 tablet by mouth daily.       doxycycline (MONODOX) 100 MG capsule Take 1 capsule (100 mg total) by mouth 2 (two) times a day for 7 days. 14 capsule 0     No current facility-administered medications for this visit.        TOBACCO USE:  Social History     Tobacco Use   Smoking Status Former Smoker   Smokeless Tobacco Current User   Tobacco Comment    Uses a vaporizer       VITALS:  Vitals:    01/29/21 0952   BP: 133/75   Patient Site: Right Arm   Patient Position: Sitting   Cuff Size: Adult Regular   Pulse: (!) 59   Temp: 97.8  F (36.6  C)   SpO2: 99%   Weight: 180 lb (81.6 kg)   Height: 5' 10.25\" (1.784 m)     Wt Readings from Last 3 Encounters:   01/29/21 180 lb (81.6 kg)   08/31/20 182 lb 12.8 oz (82.9 kg)   05/11/20 175 lb (79.4 kg)       PHYSICAL EXAM:  Constitutional:   Reveals a male who appears overall healthy.  Vitals: per nursing notes.  HEENT:  Ears:  External canals, TMs clear.    Eyes:  EOMs full, PERRL.  Musculoskeletal: No peripheral swelling.  Neuro:  Alert and oriented. Cranial nerves, motor, sensory exams are intact.  No gross focal deficits.  Psychiatric:  Memory intact, mood appropriate.    QUALITY MEASURES:      DATA REVIEWED:         "

## 2021-06-18 NOTE — PATIENT INSTRUCTIONS - HE
Patient Instructions by Shanti Lagunas FNP at 8/31/2020  9:00 AM     Author: Shanti Lagunas FNP Service: -- Author Type: Nurse Practitioner    Filed: 8/31/2020  9:27 AM Encounter Date: 8/31/2020 Status: Signed    : Shanti Lagunas FNP (Nurse Practitioner)           Preventing Skin Cancer     Use sunscreen of SPF 30 or greater. Apply liberally.   Relaxing in the sun may feel good. But it isnt good for your skin. In fact, the suns harmful rays are the major cause of skin cancer. This is a serious disease that can be life-threatening. People of all ages, races, and backgrounds are at risk.  Skin cancer is the most common cancer in the [place] But in most cases, it can be prevented.  Your role in prevention  You can act today to help prevent skin cancer. Start by avoiding the suns UV (ultraviolet) rays. And dont use tanning beds or lamps. They are no safer than the sun. Taking these steps can help keep you from getting skin cancer. It can also help prevent wrinkles and other aging effects caused by the sun. Make sure your children also follow these safeguards. Now is the time to start taking steps to prevent skin cancer.  When you are outdoors  Protect your skin when you go out during the day. Take safety steps whenever you go out to eat, run errands by car or on foot, or do any outdoor activity. There isnt just one easy way to protect your skin. Its best to follow all of these steps:    Wear tightly woven clothing that covers your skin. Put on a wide-brimmed hat to protect your face, ears, and scalp.    Watch the clock. Try to stay out of the sun between 10 a.m. and 4 p.m. That's when the sun's rays are strongest.    Head for the shade or create your own. Use an umbrella when sitting or strolling.    Know that the suns rays can reflect off sand, water, and snow. This can harm your skin. Take extra care when you are near reflective surfaces.    Keep in mind that even when the weather is hazy or cloudy,  "your skin can be exposed to strong UV rays.    Shield your skin with sunscreen. Also use sunscreen on your childrens skin. Keep babies younger than 6 months old out of the sun.  Tips for using sunscreen  To help prevent skin cancer, choose the right sunscreen and use it correctly. Try these tips:    Choose a sunscreen that has an SPF (sun protection factor) of at least 30. Also choose a sunscreen labeled \"broad spectrum. This will protect you from both UVA and UVB (ultraviolet A and B) rays.    If one brand irritates your skin, try another, such as one without fragrance.    Use a water-resistant sunscreen if you swim or sweat.    Use at least 1 ounce of sunscreen to cover exposed areas. This is enough to fill a shot glass. You might need to adjust the amount depending on your body size.    Put the sunscreen on dry skin about 15 minutes before going outdoors. This gives it time to soak in.    Reapply sunscreen every 2 hours. If youre active, do this more often.    Cover any sun-exposed skin, from your face to your feet. Dont forget your scalp, ears, and lips.    Know that while sunscreen helps protect you, it isnt enough. Sunscreens extend the length of time you can be outdoors before your skin starts to get red. But they don't give you total protection. Using sunscreen doesn't mean you can stay out in the sun for an unlimited time. Your skin cells are still being damaged. You should also wear protective clothing. And try to stay out of the sun as much as you can, especially from 10 a.m. to 4 p.m.  Date Last Reviewed: 7/1/2019 2000-2019 [employer]. [address] [place] [address], [place] [address]. All rights reserved. This information is not intended as a substitute for professional medical care. Always follow your healthcare professional's instructions.             "

## 2021-06-18 NOTE — PATIENT INSTRUCTIONS - HE
Patient Instructions by Williams Gilliam MD at 11/20/2020  4:00 PM     Author: Williams Gilliam MD Service: -- Author Type: Physician    Filed: 11/23/2020  9:33 AM Encounter Date: 11/20/2020 Status: Signed    : Williams Gilliam MD (Physician)         Should your symptoms fail to respond to the antibiotic prescribed, or if you should note onset of new associated symptoms that are suggestive of COVID-19, I would strongly encourage that you be tested for COVID-19, either at one of our [facility] testing sites or at [facility] testing site.     Pharyngitis: Strep (Presumed)    You have pharyngitis (sore throat). The healthcare staff think your sore throat is caused by streptococcus (strep) bacteria. This is often called strep throat. Strep throat can cause throat pain that is worse when swallowing, aching all over, headache, and fever. The infection is contagious. It may be spread by coughing, kissing, or touching others after touching your mouth or nose. Antibiotic medicine is given to treat the infection.  Home care    Rest at home. Drink plenty of fluids so you wont get dehydrated.    Stay home from work or school for the first 2 days of taking the antibiotics. After this time, you will not be contagious. You can then return to work or school if you are feeling better.     Take the antibiotic medicine for the full 10 days, even when you feel better. This is very important to make sure the infection is fully treated. It is also important to prevent medicine-resistant germs from growing. If you were given an antibiotic shot, no more antibiotics are needed.    You may use acetaminophen or ibuprofen to control pain or fever, unless another medicine was prescribed for this. If you have chronic liver or kidney disease or ever had a stomach ulcer or GI bleeding, talk with your healthcare provider before using these medicines.    Use throat lozenges or a throat-numbing spray to help reduce throat  pain. Gargling with warm salt water can also help reduce throat pain. Dissolve 1/2 teaspoon of salt in 1 glass of warm water.     Dont eat salty or spicy foods. These can irritate the throat.  Follow-up care  Follow up with your healthcare provider or our staff if you don't get better over the next week.  When to seek medical advice  Call your healthcare provider right away if any of these occur:    Fever as directed by your healthcare provider    New or worse ear pain, sinus pain, or headache    Painful lumps in the back of neck    Stiff neck    Lymph nodes that get larger    Cant swallow liquids, a lot of drooling, or cant open mouth wide due to throat pain    Signs of dehydration, such as very dark urine or no urine, sunken eyes, dizziness    Trouble breathing or noisy breathing    Muffled voice    New rash  Prevention  Here are steps you can take to help prevent an infection:    Keep good hand washing habits.    Dont have close contact with people who have sore throats, colds, or other upper respiratory infections.    Dont smoke, and stay away from secondhand smoke.    Stay up to date with of your vaccines.  Date Last Reviewed: 11/1/2017 2000-2017 [employer]. [address] [place] [address], [place] [address]. All rights reserved. This information is not intended as a substitute for professional medical care. Always follow your healthcare professional's instructions.

## 2021-06-20 NOTE — PROGRESS NOTES
[facility] Well Child Check    ASSESSMENT & PLAN  Greg De Leon is a 18 y.o. who has normal growth and normal development.    There are no diagnoses linked to this encounter.    Return to clinic in 1 year for a Well Child Check or sooner as needed    IMMUNIZATIONS/LABS  No immunizations due today.    REFERRALS  Dental:  The patient has already established care with a dentist.  Other:  No additional referrals were made at this time.    ANTICIPATORY GUIDANCE  I have reviewed age appropriate anticipatory guidance.    HEALTH HISTORY  Do you have any concerns that you'd like to discuss today?: No concerns       Accompanied by Mother    Refills needed? No        Do you have any significant health concerns in your family history?: Yes  Family History   Problem Relation Age of Onset     Breast cancer Mother      Colon cancer Paternal Grandmother      Since your last visit, have there been any major changes in your family, such as a move, job change, separation, divorce, or death in the family?: No  Has a lack of transportation kept you from medical appointments?: No    Home  Who lives in your home?:  Mom, dad, 2 brothers  Social History     Social History Narrative    Lives with parents, older brother: Jack, and younger brother: Ismael.      Do you have any concerns about losing your housing?: No  Is your housing safe and comfortable?: Yes  Do you have any trouble with sleep?:  No    Education  What school do you child attend?:  [employer]  What grade are you in?:  college  How do you perform in school (grades, behavior, attention, homework?: well     Eating  Do you eat regular meals including fruits and vegetables?:  yes, few vegetables  What are you drinking (cow's milk, water, soda, juice, sports drinks, energy drinks, etc)?: water and lemonade  Have you been worried that you don't have enough food?: No  Do you have concerns about your body or appearance?:  No    Activities  Do you have friends?:  yes  Do  "you get at least one hour of physical activity per day?:  yes  How many hours a day are you in front of a screen other than for schoolwork (computer, TV, phone)?:  8  What do you do for exercise?:  Work, gonna join the gym  Do you have interest/participate in community activities/volunteers/school sports?:  yes, Benjamin    MENTAL HEALTH SCREENING  PHQ-2 Total Score: 0 (2018  3:21 PM)  Depression Follow-up Plan: patient follow-up to return when and if necessary (2018  3:21 PM)  No Data Recorded    VISION  Vision: Patient is already followed by a vision specialist    No exam data present    TB Risk Assessment:  The patient and/or parent/guardian answer positive to:  patient and/or parent/guardian answer 'no' to all screening TB questions    Dyslipidemia Risk Screening  Have either of your parents or any of your grandparents had a stroke or heart attack before age 55?: No  Any parents with high cholesterol or currently taking medications to treat?: No     Dental  When was the last time you saw the dentist?: 1-3 months ago   Parent/Guardian declines the fluoride varnish application today. Fluoride not applied today.    Patient Active Problem List   Diagnosis     Acne     Epistaxis, recurrent       Drugs  Does the patient use tobacco/alcohol/drugs?:  yes    Safety  Does the patient have any safety concerns (peer or home)?:  no  Does the patient use safety belts, helmets and other safety equipment?:  yes    Sex  Have you ever had sex?:  No    MEASUREMENTS  Height:  5' 9\" (1.753 m)  Weight: 177 lb 3.2 oz (80.4 kg)  BMI: Body mass index is 26.17 kg/(m^2).  Blood Pressure: 102/64  Blood pressure percentiles are 5 % systolic and 27 % diastolic based on the 2017 AAP Clinical Practice Guideline. Blood pressure percentile targets: 90: 133/82, 95: 137/85, 95 + 12 mmH/97.    PHYSICAL EXAM  /64 (Patient Site: Right Arm, Patient Position: Sitting, Cuff Size: Adult Regular)  Pulse 64  Ht 5' 9\" (1.753 " m)  Wt 177 lb 3.2 oz (80.4 kg)  BMI 26.17 kg/m2  General appearance: alert, appears stated age and cooperative  Head: Normocephalic, without obvious abnormality, atraumatic  Eyes: conjunctivae/corneas clear. PERRL, EOM's intact. Fundi benign.  Ears: normal TM's and external ear canals both ears  Nose: Nares normal. Septum midline. Mucosa normal. No drainage or sinus tenderness.  Throat: lips, mucosa, and tongue normal; teeth and gums normal  Neck: no adenopathy, no carotid bruit, no JVD, supple, symmetrical, trachea midline and thyroid not enlarged, symmetric, no tenderness/mass/nodules  Back: symmetric, no curvature. ROM normal. No CVA tenderness.  Lungs: clear to auscultation bilaterally  Chest wall: no tenderness  Heart: regular rate and rhythm, S1, S2 normal, no murmur, click, rub or gallop  Abdomen: soft, non-tender; bowel sounds normal; no masses,  no organomegaly  Extremities: extremities normal, atraumatic, no cyanosis or edema  Pulses: 2+ and symmetric  Skin: Skin color, texture, turgor normal. No rashes or lesions  Lymph nodes: Cervical, supraclavicular, and axillary nodes normal.  Neurologic: Grossly normal

## 2021-06-21 NOTE — LETTER
Letter by Artemio Morales MD at      Author: Artemio Morales MD Service: -- Author Type: --    Filed:  Encounter Date: 2/2/2021 Status: (Other)         Greg De Leon  [id number] [facility] [id number]             February 2, 2021         Dear Mr. De Leon,    Below are the results from your recent visit: You tested also positive for chlamydia, I do finish up the 7-day course of doxycycline.    Resulted Orders   Chlamydia trachomatis & Neisseria gonorrhoeae, Amplified Detection   Result Value Ref Range    Chlamydia trachomatis, Amplified Detection Positive (!) Negative    Neisseria gonorrhoeae, Amplified Detection Negative Negative            Please call with questions or contact us using [employer].    Sincerely,        Electronically signed by Artemio Morales MD

## 2021-06-22 NOTE — TELEPHONE ENCOUNTER
Several people at home got ill at a [employer] party with stomach and digestive tract complaints that have since recovered.        17 y/o son came down with it today (5am)     Vomited x 3-4 this am   Diarrehea x 1 this am   No fever     Some ABD cramping   No blood in stool     Triaged to home care     No solid food for now - focus on hydration per care advice and then advance as tolerated to BRAT diet.        CB if other questions     Telly Cardozo, RN   Triage and Medication Refills        Reason for Disposition    Vomiting with diarrhea    Protocols used: VOMITING-A-OH

## 2021-06-27 NOTE — PROGRESS NOTES
Progress Notes by Shanti Lagunas FNP at 8/22/2019 12:50 PM     Author: Shanti Lagunas FNP Service: -- Author Type: Nurse Practitioner    Filed: 8/25/2019  8:59 PM Encounter Date: 8/22/2019 Status: Signed    : Shanti Lagunas FNP (Nurse Practitioner)       MALE PREVENTATIVE EXAM    Assessment and Plan:   1. Annual physical exam  Healthy male exam    2. Smokeless tobacco use  Discuss the need to stop vapping and sited the recent studies that showed vapping leading to over 160 cases of lung disease in 16 States. Also discuss the need to stop smoking marijuana. Patient indicates that he will be stopping soon.     Next follow up:  No follow-ups on file.    Immunization Review  Adult Imm Review: No immunizations due today  Documented tobacco use.  Website and phone contact for [employer]uit[employer] given to patient in AVS.    I discussed the following with the patient:   Adult Healthy Living: Importance of regular exercise  Healthy nutrition  Getting adequate sleep  Stress management  Use of seat belts  Distracted driving  Helmets  STI prevention    I have had an Advance Directives discussion with the patient.    Subjective:   Chief Complaint: Greg De Leon is an 19 y.o. male here for a preventative health visit.     HPI:  Patient reports that he is doing well. He has been home from school for the summer and had a summer job. He will going back in one week. He has no concern today. He denied chest pain, shortness of breath, fever and chills.    Healthy Habits  Are you taking a daily aspirin? No  Do you typically exercising at least 40 min, 3-4 times per week?  Yes  Do you usually eat at least 4 servings of fruit and vegetables a day, include whole grains and fiber and avoid regularly eating high fat foods? Yes  Have you had an eye exam in the past two years? Yes  Do you see a dentist twice per year? Yes  Do you have any concerns regarding sleep? No    Safety Screen  If you own firearms, are they secured in a  "locked gun cabinet or with trigger locks? The patient does not own any firearms  Do you feel you are safe where you are living?: Yes (8/22/2019  1:00 PM)  Do you feel you are safe in your relationship(s)?: Yes (8/22/2019  1:00 PM)      Review of Systems:  Please see above.  The rest of the review of systems are negative for all systems.     Cancer Screening     Patient has no health maintenance due at this time          Patient Care Team:  Shanti Lagunas FNP as PCP - General (Nurse Practitioner)        History     Not marked as reviewed during this visit.            Objective:   Vital Signs: There were no vitals taken for this visit.       PHYSICAL EXAM  /72   Pulse 63   Ht 5' 9.25\" (1.759 m)   Wt 182 lb 3 oz (82.6 kg)   SpO2 98%   BMI 26.71 kg/m      General Appearance:    Alert, cooperative, no distress, appears stated age   Head:    Normocephalic, without obvious abnormality, atraumatic   Eyes:    PERRL, conjunctiva/corneas clear, EOM's intact, fundi     benign, both eyes        Ears:    Normal TM's and external ear canals, both ears   Nose:   Nares normal, septum midline, mucosa normal, no drainage    or sinus tenderness   Throat:   Lips, mucosa, and tongue normal; teeth and gums normal   Neck:   Supple, symmetrical, trachea midline, no adenopathy;        thyroid:  No enlargement/tenderness/nodules; no carotid    bruit or JVD   Back:     Symmetric, no curvature, ROM normal, no CVA tenderness   Lungs:     Clear to auscultation bilaterally, respirations unlabored   Chest wall:    No tenderness or deformity   Heart:    Regular rate and rhythm, S1 and S2 normal, no murmur, rub    or gallop   Abdomen:     Soft, non-tender, bowel sounds active all four quadrants,     no masses, no organomegaly   Genitalia:    Normal male without lesion, discharge or tenderness   Rectal:    Normal tone, normal prostate, no masses or tenderness;    guaiac negative stool   Extremities:   Extremities normal, atraumatic, no " cyanosis or edema   Pulses:   2+ and symmetric all extremities   Skin:   Skin color, texture, turgor normal, no rashes or lesions   Lymph nodes:   Cervical, supraclavicular, and axillary nodes normal   Neurologic:   CNII-XII intact. Normal strength, sensation and reflexes       throughout                Medication List           Accurate as of 8/22/19 11:59 PM. If you have any questions, ask your nurse or doctor.               CONTINUE taking these medications    calcium-vitamin D 500 mg(1,250mg) -200 unit per tablet  INSTRUCTIONS:  Take 1 tablet by mouth 2 (two) times a day with meals.  Generic drug:  calcium-vitamin D        cetirizine 10 MG tablet  Also known as:  ZyrTEC  INSTRUCTIONS:  Take 10 mg by mouth daily.        MAGNESIUM CITRATE ORAL  INSTRUCTIONS:  Take 200 mg by mouth.        multivitamin therapeutic tablet  Also known as:  THERAGRAN  INSTRUCTIONS:  Take 1 tablet by mouth daily.        ranitidine 150 MG tablet  Also known as:  ZANTAC  INSTRUCTIONS:  Take 1 tablet (150 mg total) by mouth 2 (two) times a day.               Additional Screenings Completed Today:

## 2021-06-29 NOTE — PROGRESS NOTES
Progress Notes by Shanti Lagunas FNP at 8/31/2020  9:00 AM     Author: Shanti Lagunas FNP Service: -- Author Type: Nurse Practitioner    Filed: 8/31/2020  9:37 AM Encounter Date: 8/31/2020 Status: Signed    : Shanti Lagunas FNP (Nurse Practitioner)       ASSESSMENT:  1. Encounter for general adult medical examination without abnormal findings  2. Encounter for screening for diabetes mellitus  3. Encounter for screening for lipoid disorders  Healthy male exam. Sustained a cut on his right shin but healing ok. TD not due until next year.     - Hemoglobin  - Glucose  - Lipid [place]- FASTING    PLAN:  Patient Instructions         Preventing Skin Cancer     Use sunscreen of SPF 30 or greater. Apply liberally.   Relaxing in the sun may feel good. But it isnt good for your skin. In fact, the suns harmful rays are the major cause of skin cancer. This is a serious disease that can be life-threatening. People of all ages, races, and backgrounds are at risk.  Skin cancer is the most common cancer in the [place] But in most cases, it can be prevented.  Your role in prevention  You can act today to help prevent skin cancer. Start by avoiding the suns UV (ultraviolet) rays. And dont use tanning beds or lamps. They are no safer than the sun. Taking these steps can help keep you from getting skin cancer. It can also help prevent wrinkles and other aging effects caused by the sun. Make sure your children also follow these safeguards. Now is the time to start taking steps to prevent skin cancer.  When you are outdoors  Protect your skin when you go out during the day. Take safety steps whenever you go out to eat, run errands by car or on foot, or do any outdoor activity. There isnt just one easy way to protect your skin. Its best to follow all of these steps:    Wear tightly woven clothing that covers your skin. Put on a wide-brimmed hat to protect your face, ears, and scalp.    Watch the clock. Try to stay out of  "the sun between 10 a.m. and 4 p.m. That's when the sun's rays are strongest.    Head for the shade or create your own. Use an umbrella when sitting or strolling.    Know that the suns rays can reflect off sand, water, and snow. This can harm your skin. Take extra care when you are near reflective surfaces.    Keep in mind that even when the weather is hazy or cloudy, your skin can be exposed to strong UV rays.    Shield your skin with sunscreen. Also use sunscreen on your childrens skin. Keep babies younger than 6 months old out of the sun.  Tips for using sunscreen  To help prevent skin cancer, choose the right sunscreen and use it correctly. Try these tips:    Choose a sunscreen that has an SPF (sun protection factor) of at least 30. Also choose a sunscreen labeled \"broad spectrum. This will protect you from both UVA and UVB (ultraviolet A and B) rays.    If one brand irritates your skin, try another, such as one without fragrance.    Use a water-resistant sunscreen if you swim or sweat.    Use at least 1 ounce of sunscreen to cover exposed areas. This is enough to fill a shot glass. You might need to adjust the amount depending on your body size.    Put the sunscreen on dry skin about 15 minutes before going outdoors. This gives it time to soak in.    Reapply sunscreen every 2 hours. If youre active, do this more often.    Cover any sun-exposed skin, from your face to your feet. Dont forget your scalp, ears, and lips.    Know that while sunscreen helps protect you, it isnt enough. Sunscreens extend the length of time you can be outdoors before your skin starts to get red. But they don't give you total protection. Using sunscreen doesn't mean you can stay out in the sun for an unlimited time. Your skin cells are still being damaged. You should also wear protective clothing. And try to stay out of the sun as much as you can, especially from 10 a.m. to 4 p.m.  Date Last Reviewed: 7/1/2019 2000-2019 [employer] " [employer]. [address] [place] [address], [place] [address]. All rights reserved. This information is not intended as a substitute for professional medical care. Always follow your healthcare professional's instructions.            Orders Placed This Encounter   Procedures   ? Lipid [place]- FASTING     Order Specific Question:   Fasting is required?     Answer:   Yes   ? Glucose   ? Hemoglobin     Medications Discontinued During This Encounter   Medication Reason   ? sertraline (ZOLOFT) 25 MG tablet        Return in 1 year (on 8/31/2021).    Health Maintenance Due   Topic Date Due   ? INFLUENZA VACCINE RULE BASED (1) 08/01/2020   ? PREVENTIVE CARE VISIT  08/22/2020   ? TD 18+ HE  05/16/2021         CHIEF COMPLAINT:  Chief Complaint   Patient presents with   ? Annual Exam         HISTORY OF PRESENT ILLNESS:  Greg De Leon is a 20 y.o. male presenting to the clinic today for a physical exam. Patient reports that he is doing well and will be going back to school next week. He reports that sometime in June he got an injury while getting off the boat and it has continued to heel well. Patient has no other concerns today. He denied chest pain, shortness of breath, fever and chills.     Healthy Habits  Are you taking a daily aspirin? No  Do you typically exercising at least 40 min, 3-4 times per week?  Yes  Do you usually eat at least 4 servings of fruit and vegetables a day, include whole grains and fiber and avoid regularly eating high fat foods? Yes  Have you had an eye exam in the past two years? Yes  Do you see a dentist twice per year? Yes  Do you have any concerns regarding sleep? No  Do you drink caffeine? No    Safety Screen  If you own firearms, are they secured in a locked gun cabinet or with trigger locks? Yes    REVIEW OF SYSTEMS:   All other systems are negative.    PFSH:    Social History     Tobacco Use   Smoking Status Former Smoker   Smokeless Tobacco Current User   Tobacco Comment    Uses a vaporizer      Family History   Problem Relation Age of Onset   ? Breast cancer Mother    ? Colon cancer Paternal Grandmother      Social History     Socioeconomic History   ? Marital status: Single     Spouse name: Not on file   ? Number of children: Not on file   ? Years of education: Not on file   ? Highest education level: Not on file   Occupational History   ? Not on file   Social Needs   ? Financial resource strain: Not on file   ? Food insecurity     Worry: Not on file     Inability: Not on file   ? Transportation needs     Medical: Not on file     Non-medical: Not on file   Tobacco Use   ? Smoking status: Former Smoker   ? Smokeless tobacco: Current User   ? Tobacco comment: Uses a vaporizer   Substance and Sexual Activity   ? Alcohol use: No   ? Drug use: Yes     Types: Marijuana   ? Sexual activity: Yes     Partners: Female   Lifestyle   ? Physical activity     Days per week: Not on file     Minutes per session: Not on file   ? Stress: Not on file   Relationships   ? Social connections     Talks on phone: Not on file     Gets together: Not on file     Attends Church service: Not on file     Active member of club or organization: Not on file     Attends meetings of clubs or organizations: Not on file     Relationship status: Not on file   ? Intimate partner violence     Fear of current or ex partner: Not on file     Emotionally abused: Not on file     Physically abused: Not on file     Forced sexual activity: Not on file   Other Topics Concern   ? Not on file   Social History Narrative    Lives with parents, older brother: Molina, and younger brother: Ismael.      No past surgical history on file.  No Known Allergies  Active Ambulatory Problems     Diagnosis Date Noted   ? Acne 07/17/2015   ? Epistaxis, recurrent 07/17/2015     Resolved Ambulatory Problems     Diagnosis Date Noted   ? Acute pharyngitis    ? Attention deficit hyperactivity disorder (ADHD)      No Additional Past Medical History       VITALS:  There  were no vitals filed for this visit.  BP Readings from Last 3 Encounters:   03/03/20 114/64   01/20/20 126/72   01/07/20 118/55     Wt Readings from Last 3 Encounters:   05/11/20 175 lb (79.4 kg)   03/03/20 174 lb (78.9 kg) (75 %, Z= 0.67)*   01/20/20 177 lb 7 oz (80.5 kg) (79 %, Z= 0.79)*     * Growth percentiles are based on [facility] (Boys, 2-20 Years) data.     There is no height or weight on file to calculate BMI.    PHYSICAL EXAM:  General Appearance: Alert, cooperative, no distress, appears stated age  Head: Normocephalic, without obvious abnormality, atraumatic  Eyes: PERRL, conjunctiva/corneas clear, EOM's intact  Ears: Normal TM's and external ear canals, both ears  Nose: Nares normal, septum midline,mucosa normal, no drainage  Throat: Lips, mucosa, and tongue normal; teeth and gums normal  Neck: Supple, symmetrical, trachea midline, no adenopathy;  thyroid: not enlarged, symmetric, no tenderness/mass/nodules; no carotid bruit or JVD  Back: Symmetric, no curvature, ROM normal, no CVA tenderness  Lungs: Clear to auscultation bilaterally, respirations unlabored  Heart: Regular rate and rhythm, S1 and S2 normal, no murmur, rub, or gallop, Abdomen: Soft, non-tender, bowel sounds active all four quadrants,  no masses, no organomegaly  Extremities: Extremities normal, atraumatic, no cyanosis or edema  Skin: Skin color, texture, turgor normal, no rashes or lesions  Lymph nodes: Cervical, supraclavicular, and axillary nodes normal  Neurologic: Normal     QUALITY MEASURES:  The following high BMI interventions were performed this visit: encouragement to exercise and dietary management education, guidance, and counseling    DATA REVIEWED:  ADDITIONAL HISTORY SUMMARIZED (2): None.  DECISION TO OBTAIN EXTRA INFORMATION (1): None.   RADIOLOGY TESTS (1): None.  LABS (1): None.  MEDICINE TESTS (1): None.  INDEPENDENT REVIEW (2 each): None.     The visit lasted a total of 30 minutes face to face with the patient. Over 50% of  the time was spent counseling and educating the patient about 15.    MEDICATIONS:  Current Outpatient Medications   Medication Sig Dispense Refill   ? calcium-vitamin D (CALCIUM-VITAMIN D) 500 mg(1,250mg) -200 unit per tablet Take 1 tablet by mouth 2 (two) times a day with meals.     ? MAGNESIUM CITRATE ORAL Take 200 mg by mouth.     ? multivitamin therapeutic (THERAGRAN) tablet Take 1 tablet by mouth daily.     ? cetirizine (ZYRTEC) 10 MG tablet Take 10 mg by mouth daily.     ? sertraline (ZOLOFT) 25 MG tablet Take 1 tablet (25 mg total) by mouth daily. 90 tablet 3     No current facility-administered medications for this visit.

## 2021-07-03 NOTE — ADDENDUM NOTE
Addendum Note by Shanti Rice FNP at 1/30/2020  3:05 PM     Author: Shanti Rice FNP Service: -- Author Type: Nurse Practitioner    Filed: 1/30/2020  3:05 PM Encounter Date: 1/30/2020 Status: Signed    : Shanti Rice FNP (Nurse Practitioner)    Addended by: SHANTI RICE on: 1/30/2020 03:05 PM        Modules accepted: Orders

## 2021-09-02 ENCOUNTER — OFFICE VISIT (OUTPATIENT)
Dept: FAMILY MEDICINE | Facility: CLINIC | Age: 21
End: 2021-09-02
Payer: COMMERCIAL

## 2021-09-02 VITALS
SYSTOLIC BLOOD PRESSURE: 120 MMHG | HEART RATE: 72 BPM | BODY MASS INDEX: 25.86 KG/M2 | WEIGHT: 180.6 LBS | HEIGHT: 70 IN | DIASTOLIC BLOOD PRESSURE: 60 MMHG

## 2021-09-02 DIAGNOSIS — Z00.00 ROUTINE GENERAL MEDICAL EXAMINATION AT A HEALTH CARE FACILITY: Primary | ICD-10-CM

## 2021-09-02 PROCEDURE — 90715 TDAP VACCINE 7 YRS/> IM: CPT | Performed by: NURSE PRACTITIONER

## 2021-09-02 PROCEDURE — 90471 IMMUNIZATION ADMIN: CPT | Performed by: NURSE PRACTITIONER

## 2021-09-02 PROCEDURE — 99395 PREV VISIT EST AGE 18-39: CPT | Mod: 25 | Performed by: NURSE PRACTITIONER

## 2021-09-02 ASSESSMENT — MIFFLIN-ST. JEOR: SCORE: 1830.45

## 2021-09-02 NOTE — PROGRESS NOTES
SUBJECTIVE:   CC: Greg De Leon is an 21 year old male who presents for preventative health visit.       Patient has been advised of split billing requirements and indicates understanding: Yes  Healthy Habits:     Getting at least 3 servings of Calcium per day:  Yes    Bi-annual eye exam:  Yes    Dental care twice a year:  Yes    Sleep apnea or symptoms of sleep apnea:  Excessive snoring    Diet:  Regular (no restrictions)    Frequency of exercise:  6-7 days/week    Duration of exercise:  Greater than 60 minutes    Taking medications regularly:  Yes    Medication side effects:  None    PHQ-2 Total Score: 1    Additional concerns today:  No          PROBLEMS TO ADD ON...    Today's PHQ-2 Score:   PHQ-2 ( 1999 [employer]) 9/2/2021   Q1: Little interest or pleasure in doing things 0   Q2: Feeling down, depressed or hopeless 1   PHQ-2 Score 1   Q1: Little interest or pleasure in doing things Not at all   Q2: Feeling down, depressed or hopeless Several days   PHQ-2 Score 1       Abuse: Current or Past(Physical, Sexual or Emotional)- No  Do you feel safe in your environment? Yes        Social History     Tobacco Use     Smoking status: Former Smoker     Smokeless tobacco: Current User     Tobacco comment: Uses a vaporizer   Substance Use Topics     Alcohol use: No       Alcohol Use 9/2/2021   Prescreen: >3 drinks/day or >7 drinks/week? Yes   AUDIT SCORE  9     AUDIT - Alcohol Use Disorders Identification Test - Reproduced from the World Health Organization Audit 2001 (Second Edition) 9/2/2021   1.  How often do you have a drink containing alcohol? 2 to 3 times a week   2.  How many drinks containing alcohol do you have on a typical day when you are drinking? 5 or 6   3.  How often do you have five or more drinks on one occasion? Weekly   4.  How often during the last year have you found that you were not able to stop drinking once you had started? Never   5.  How often during the last year have you failed to do what was  normally expected of you because of drinking? Never   6.  How often during the last year have you needed a first drink in the morning to get yourself going after a heavy drinking session? Never   7.  How often during the last year have you had a feeling of guilt or remorse after drinking? Never   8.  How often during the last year have you been unable to remember what happened the night before because of your drinking? Less than monthly   9.  Have you or someone else been injured because of your drinking? No   10. Has a relative, friend, doctor or other health care worker been concerned about your drinking or suggested you cut down? No   TOTAL SCORE 9       Last PSA: No results found for: PSA    Reviewed orders with patient. Reviewed health maintenance and updated orders accordingly - Yes  Lab work is in process    Reviewed and updated as needed this visit by clinical staff                 Reviewed and updated as needed this visit by Provider                No past medical history on file.   No past surgical history on file.    Review of Systems  CONSTITUTIONAL: NEGATIVE for fever, chills, change in weight  INTEGUMENTARY/SKIN: NEGATIVE for worrisome rashes, moles or lesions  EYES: NEGATIVE for vision changes or irritation  ENT: NEGATIVE for ear, mouth and throat problems  RESP: NEGATIVE for significant cough or SOB  CV: NEGATIVE for chest pain, palpitations or peripheral edema  GI: NEGATIVE for nausea, abdominal pain, heartburn, or change in bowel habits   male: negative for dysuria, hematuria, decreased urinary stream, erectile dysfunction, urethral discharge  MUSCULOSKELETAL: NEGATIVE for significant arthralgias or myalgia  NEURO: NEGATIVE for weakness, dizziness or paresthesias  PSYCHIATRIC: NEGATIVE for changes in mood or affect    OBJECTIVE:   There were no vitals taken for this visit.    Physical Exam  GENERAL: healthy, alert and no distress  EYES: Eyes grossly normal to inspection, PERRL and conjunctivae  "and sclerae normal  HENT: ear canals and TM's normal, nose and mouth without ulcers or lesions  NECK: no adenopathy, no asymmetry, masses, or scars and thyroid normal to palpation  RESP: lungs clear to auscultation - no rales, rhonchi or wheezes  CV: regular rate and rhythm, normal S1 S2, no S3 or S4, no murmur, click or rub, no peripheral edema and peripheral pulses strong  ABDOMEN: soft, nontender, no hepatosplenomegaly, no masses and bowel sounds normal  MS: no gross musculoskeletal defects noted, no edema  SKIN: no suspicious lesions or rashes  NEURO: Normal strength and tone, mentation intact and speech normal  PSYCH: mentation appears normal, affect normal/bright    Diagnostic Test Results:  Labs reviewed in Epic    ASSESSMENT/PLAN:   Greg was seen today for physical.    Diagnoses and all orders for this visit:    Routine general medical examination at a health care facility  -     TDAP VACCINE (Adacel, Boostrix)  [[id number]]        Patient has been advised of split billing requirements and indicates understanding: Yes  COUNSELING:   Reviewed preventive health counseling, as reflected in patient instructions    Estimated body mass index is 25.64 kg/m  as calculated from the following:    Height as of 1/29/21: 1.784 m (5' 10.25\").    Weight as of 1/29/21: 81.6 kg (180 lb).     Weight management plan: Discussed healthy diet and exercise guidelines    He reports that he has quit smoking. He uses smokeless tobacco.      Counseling Resources:  ATP IV Guidelines  Pooled Cohorts Equation Calculator  FRAX Risk Assessment  ICSI Preventive Guidelines  Dietary Guidelines for Americans, 2010  USDA's MyPlate  ASA Prophylaxis  Lung CA Screening    Promise BERNABE Lagunas CNP  [facility]  "

## 2021-10-17 ENCOUNTER — HEALTH MAINTENANCE LETTER (OUTPATIENT)
Age: 21
End: 2021-10-17

## 2022-04-04 ENCOUNTER — TRANSFERRED RECORDS (OUTPATIENT)
Dept: HEALTH INFORMATION MANAGEMENT | Facility: CLINIC | Age: 22
End: 2022-04-04
Payer: COMMERCIAL

## 2022-08-19 ENCOUNTER — OFFICE VISIT (OUTPATIENT)
Dept: INTERNAL MEDICINE | Facility: CLINIC | Age: 22
End: 2022-08-19
Payer: COMMERCIAL

## 2022-08-19 VITALS
OXYGEN SATURATION: 98 % | SYSTOLIC BLOOD PRESSURE: 98 MMHG | HEART RATE: 54 BPM | HEIGHT: 70 IN | BODY MASS INDEX: 25.34 KG/M2 | DIASTOLIC BLOOD PRESSURE: 66 MMHG | WEIGHT: 177 LBS

## 2022-08-19 DIAGNOSIS — Z00.00 ENCOUNTER FOR ROUTINE ADULT MEDICAL EXAMINATION: Primary | ICD-10-CM

## 2022-08-19 PROCEDURE — 99395 PREV VISIT EST AGE 18-39: CPT | Performed by: INTERNAL MEDICINE

## 2022-08-19 RX ORDER — VALACYCLOVIR HYDROCHLORIDE 1 G/1
TABLET, FILM COATED ORAL
COMMUNITY
Start: 2022-06-20 | End: 2022-08-19

## 2022-08-19 ASSESSMENT — ENCOUNTER SYMPTOMS
MYALGIAS: 0
HEARTBURN: 0
EYE PAIN: 0
PALPITATIONS: 0
WEAKNESS: 0
DYSURIA: 0
SHORTNESS OF BREATH: 0
DIZZINESS: 0
HEMATOCHEZIA: 0
HEMATURIA: 0
HEADACHES: 0
ABDOMINAL PAIN: 0
COUGH: 0
NERVOUS/ANXIOUS: 0
NAUSEA: 0
PARESTHESIAS: 0
ARTHRALGIAS: 0
SORE THROAT: 0
DIARRHEA: 0
FEVER: 0
JOINT SWELLING: 0
CONSTIPATION: 0
FREQUENCY: 0
CHILLS: 0

## 2022-08-19 NOTE — PROGRESS NOTES
SUBJECTIVE:   CC: Greg De Leon is an 22 year old male who presents for preventative health visit.     Greg comes in today to establish care as well as for a physical exam.  He is feeling well today and has no acute complaints.  He has no significant past medical or surgical history.  He is on no prescription medications.  No known drug allergies.  No family history of colon or prostate cancer.  He recently graduated from the [employer] and works for a company in [employer].    care    Patient has been advised of split billing requirements and indicates understanding: Yes  Healthy Habits:     Getting at least 3 servings of Calcium per day:  NO    Bi-annual eye exam:  Yes    Dental care twice a year:  Yes    Sleep apnea or symptoms of sleep apnea:  Excessive snoring    Diet:  Regular (no restrictions) and Breakfast skipped    Frequency of exercise:  4-5 days/week    Duration of exercise:  45-60 minutes    Taking medications regularly:  Yes    Barriers to taking medications:  None    Medication side effects:  None    PHQ-2 Total Score: 0    Additional concerns today:  No      Today's PHQ-2 Score:   PHQ-2 ( 1999 Pfizer) 8/19/2022   Q1: Little interest or pleasure in doing things -   Q2: Feeling down, depressed or hopeless -   PHQ-2 Score -   PHQ-2 Total Score (12-17 Years)- Positive if 3 or more points; Administer PHQ-A if positive -   Q1: Little interest or pleasure in doing things -   Q2: Feeling down, depressed or hopeless -   PHQ-2 Score Incomplete       Do you feel safe in your environment? Yes        Social History     Tobacco Use     Smoking status: Former Smoker     Smokeless tobacco: Current User     Tobacco comment: Uses a vaporizer   Substance Use Topics     Alcohol use: No         Alcohol Use 9/2/2021   Prescreen: >3 drinks/day or >7 drinks/week? Yes   Prescreen: >3 drinks/day or >7 drinks/week? -   AUDIT SCORE  9         OBJECTIVE:   There were no vitals taken for this visit.    Physical  "Exam  GENERAL: healthy, alert and no distress  EYES: Eyes grossly normal to inspection, PERRL and conjunctivae and sclerae normal  HENT: ear canals and TM's normal, nose and mouth without ulcers or lesions  NECK: no adenopathy, no asymmetry, masses, or scars and thyroid normal to palpation  RESP: lungs clear to auscultation - no rales, rhonchi or wheezes  CV: regular rate and rhythm, normal S1 S2, no S3 or S4, no murmur, click or rub, no peripheral edema and peripheral pulses strong  ABDOMEN: soft, nontender, no hepatosplenomegaly, no masses and bowel sounds normal  MS: no gross musculoskeletal defects noted, no edema  SKIN: no suspicious lesions or rashes  NEURO: Normal strength and tone, mentation intact and speech normal  PSYCH: mentation appears normal, affect normal/bright    Diagnostic Test Results:  Labs reviewed in Epic    ASSESSMENT/PLAN:   (Z00.00) Encounter for routine adult medical examination  (primary encounter diagnosis)  Comment: Vaccinations are up-to-date.  No labs needed today.  Colon cancer screening starts at 45.  Prostate cancer screening starts at 50.  Discussed age related healthy habits such as not getting behind the wheel with somebody who has been drinking and protection with sexual intercourse.  Follow-up 1 to 2 years , earlier if needed  Plan:       Estimated body mass index is 25.91 kg/m  as calculated from the following:    Height as of 9/2/21: 1.778 m (5' 10\").    Weight as of 9/2/21: 81.9 kg (180 lb 9.6 oz).         He reports that he has quit smoking. He uses smokeless tobacco.      Counseling Resources:  ATP IV Guidelines  Pooled Cohorts Equation Calculator  FRAX Risk Assessment  ICSI Preventive Guidelines  Dietary Guidelines for Americans, 2010  USDA's MyPlate  ASA Prophylaxis  Lung CA Screening    Bakari Martinez MD  [facility]  "

## 2022-08-23 DIAGNOSIS — Z00.00 ENCOUNTER FOR ROUTINE ADULT MEDICAL EXAMINATION: Primary | ICD-10-CM

## 2022-08-25 RX ORDER — VALACYCLOVIR HYDROCHLORIDE 1 G/1
TABLET, FILM COATED ORAL
Qty: 4 TABLET | Refills: 2 | OUTPATIENT
Start: 2022-08-25

## 2022-08-25 NOTE — TELEPHONE ENCOUNTER
Outpatient Medication Detail     Disp Refills Start End JASMIN   valACYclovir (VALTREX) 1000 mg tablet (Discontinued)   6/20/2022 8/19/2022 --   Sig: TAKE 2 TABLETS BY MOUTH AT ONSET OF OUTBREAK. REPEAT DOSE IN 12 HOURS   Patient not taking: Reported on 8/19/2022        Class: Historical   Order: [id number]

## 2022-08-26 RX ORDER — VALACYCLOVIR HYDROCHLORIDE 1 G/1
TABLET, FILM COATED ORAL
Qty: 20 TABLET | Refills: 4 | Status: SHIPPED | OUTPATIENT
Start: 2022-08-26 | End: 2023-09-07

## 2022-08-26 NOTE — ADDENDUM NOTE
Addended by: WILLIAM RICO on: 8/26/2022 10:28 AM     Modules accepted: Orders    
[id number] Comprehensive

## 2023-09-05 DIAGNOSIS — Z00.00 ENCOUNTER FOR ROUTINE ADULT MEDICAL EXAMINATION: ICD-10-CM

## 2023-09-06 NOTE — TELEPHONE ENCOUNTER
"Routing refill request to provider for review/approval because:  Labs not current:  No CR on file  Patient needs to be seen because it has been more than 1 year since last office visit.    Last Written Prescription Date:  8/26/22  Last Fill Quantity: 20,  # refills: 4   Last office visit provider:  8/19/22     Requested Prescriptions   Pending Prescriptions Disp Refills    valACYclovir (VALTREX) 1000 mg tablet [Pharmacy Med Name: VALACYCLOVIR HCL 1 GRAM TABLET] 20 tablet 4     Sig: TAKE 2 TABLETS BY MOUTH AT ONSET OF OUTBREAK. REPEAT DOSE IN 12 HOURS.       Antivirals for Herpes Protocol Failed - 9/6/2023  7:47 AM        Failed - Recent (12 mo) or future (30 days) visit within the authorizing provider's specialty     Patient has had an office visit with the authorizing provider or a provider within the authorizing providers department within the previous 12 mos or has a future within next 30 days. See \"Patient Info\" tab in inbasket, or \"Choose Columns\" in Meds & Orders section of the refill encounter.              Failed - Normal serum creatinine on file in past 12 months     No lab results found.    Ok to refill medication if creatinine is low          Passed - Patient is age 12 or older        Passed - Medication is active on med list             Ca Aldana RN 09/06/23 7:47 AM  "

## 2023-09-07 RX ORDER — VALACYCLOVIR HYDROCHLORIDE 1 G/1
TABLET, FILM COATED ORAL
Qty: 20 TABLET | Refills: 4 | Status: SHIPPED | OUTPATIENT
Start: 2023-09-07

## 2023-10-09 ASSESSMENT — ENCOUNTER SYMPTOMS
WEAKNESS: 0
DIZZINESS: 0
ARTHRALGIAS: 0
DIARRHEA: 0
CHILLS: 0
EYE PAIN: 0
HEMATURIA: 0
DYSURIA: 0
FEVER: 0
SHORTNESS OF BREATH: 0
ABDOMINAL PAIN: 0
CONSTIPATION: 0
PALPITATIONS: 0
PARESTHESIAS: 0
HEMATOCHEZIA: 0
MYALGIAS: 0
NERVOUS/ANXIOUS: 1
HEARTBURN: 0
HEADACHES: 0
JOINT SWELLING: 0
NAUSEA: 0
SORE THROAT: 0
FREQUENCY: 0
COUGH: 0

## 2023-10-10 ENCOUNTER — OFFICE VISIT (OUTPATIENT)
Dept: INTERNAL MEDICINE | Facility: CLINIC | Age: 23
End: 2023-10-10
Payer: COMMERCIAL

## 2023-10-10 VITALS
DIASTOLIC BLOOD PRESSURE: 62 MMHG | HEIGHT: 70 IN | WEIGHT: 192 LBS | TEMPERATURE: 98 F | RESPIRATION RATE: 16 BRPM | SYSTOLIC BLOOD PRESSURE: 110 MMHG | BODY MASS INDEX: 27.49 KG/M2 | HEART RATE: 52 BPM | OXYGEN SATURATION: 98 %

## 2023-10-10 DIAGNOSIS — Z00.00 ANNUAL PHYSICAL EXAM: Primary | ICD-10-CM

## 2023-10-10 DIAGNOSIS — B00.1 RECURRENT COLD SORES: ICD-10-CM

## 2023-10-10 DIAGNOSIS — F12.90 MARIJUANA USER: ICD-10-CM

## 2023-10-10 DIAGNOSIS — Z13.1 SCREENING FOR DIABETES MELLITUS: ICD-10-CM

## 2023-10-10 LAB
CHOLEST SERPL-MCNC: 174 MG/DL
HBA1C MFR BLD: 5.5 % (ref 0–5.6)
HDLC SERPL-MCNC: 48 MG/DL
LDLC SERPL CALC-MCNC: 114 MG/DL
NONHDLC SERPL-MCNC: 126 MG/DL
TRIGL SERPL-MCNC: 62 MG/DL

## 2023-10-10 PROCEDURE — 90471 IMMUNIZATION ADMIN: CPT | Performed by: NURSE PRACTITIONER

## 2023-10-10 PROCEDURE — 83036 HEMOGLOBIN GLYCOSYLATED A1C: CPT | Performed by: NURSE PRACTITIONER

## 2023-10-10 PROCEDURE — 99395 PREV VISIT EST AGE 18-39: CPT | Mod: 25 | Performed by: NURSE PRACTITIONER

## 2023-10-10 PROCEDURE — 80061 LIPID PANEL: CPT | Performed by: NURSE PRACTITIONER

## 2023-10-10 PROCEDURE — 36415 COLL VENOUS BLD VENIPUNCTURE: CPT | Performed by: NURSE PRACTITIONER

## 2023-10-10 PROCEDURE — 90686 IIV4 VACC NO PRSV 0.5 ML IM: CPT | Performed by: NURSE PRACTITIONER

## 2023-10-10 ASSESSMENT — ENCOUNTER SYMPTOMS
COUGH: 0
HEMATURIA: 0
HEARTBURN: 0
CHILLS: 0
DIZZINESS: 0
DIARRHEA: 0
HEADACHES: 0
ARTHRALGIAS: 0
MYALGIAS: 0
CONSTIPATION: 0
FEVER: 0
SORE THROAT: 0
PARESTHESIAS: 0
FREQUENCY: 0
EYE PAIN: 0
JOINT SWELLING: 0
SHORTNESS OF BREATH: 0
DYSURIA: 0
NAUSEA: 0
HEMATOCHEZIA: 0
PALPITATIONS: 0
NERVOUS/ANXIOUS: 1
ABDOMINAL PAIN: 0
WEAKNESS: 0

## 2023-10-10 NOTE — PATIENT INSTRUCTIONS
Blood pressure and other vital signs look good.    Weight looks good.    Lab work today.    Flu shot.    Follow-up in 1 to 2 years, sooner if needed

## 2023-10-10 NOTE — PROGRESS NOTES
SUBJECTIVE:   CC: Greg is an 23 year old who presents for preventative health visit.       10/10/2023     6:54 AM   Additional Questions   Roomed by Belinda FRANCOIS   Accompanied by ruby         10/10/2023     6:54 AM   Patient Reported Additional Medications   Patient reports taking the following new medications no       Healthy Habits:     Getting at least 3 servings of Calcium per day:  NO    Bi-annual eye exam:  NO    Dental care twice a year:  Yes    Sleep apnea or symptoms of sleep apnea:  Excessive snoring    Diet:  Regular (no restrictions)    Frequency of exercise:  2-3 days/week    Duration of exercise:  Greater than 60 minutes    Taking medications regularly:  Yes    Medication side effects:  None    Additional concerns today:  No      Today's PHQ-2 Score:       10/9/2023     7:38 PM   PHQ-2 ( 1999 Pfizer)   Q1: Little interest or pleasure in doing things 0   Q2: Feeling down, depressed or hopeless 0   PHQ-2 Score 0   Q1: Little interest or pleasure in doing things Not at all   Q2: Feeling down, depressed or hopeless Not at all   PHQ-2 Score 0                 Social History     Tobacco Use    Smoking status: Former    Smokeless tobacco: Current     Types: Chew    Tobacco comments:     Uses a vaporizer   Substance Use Topics    Alcohol use: No             10/9/2023     7:38 PM   Alcohol Use   Prescreen: >3 drinks/day or >7 drinks/week? No       Last PSA: No results found for: PSA    Reviewed orders with patient. Reviewed health maintenance and updated orders accordingly -       Reviewed and updated as needed this visit by clinical staff   Tobacco  Allergies  Meds              Reviewed and updated as needed this visit by Provider                     Review of Systems   Constitutional:  Negative for chills and fever.   HENT:  Positive for congestion. Negative for ear pain, hearing loss and sore throat.    Eyes:  Negative for pain and visual disturbance.   Respiratory:  Negative for cough and shortness of breath.   "  Cardiovascular:  Negative for chest pain, palpitations and peripheral edema.   Gastrointestinal:  Negative for abdominal pain, constipation, diarrhea, heartburn, hematochezia and nausea.   Genitourinary:  Negative for dysuria, frequency, genital sores, hematuria, impotence, penile discharge and urgency.   Musculoskeletal:  Negative for arthralgias, joint swelling and myalgias.   Skin:  Negative for rash.   Neurological:  Negative for dizziness, weakness, headaches and paresthesias.   Psychiatric/Behavioral:  Negative for mood changes. The patient is nervous/anxious.      CONSTITUTIONAL: NEGATIVE for fever, chills, change in weight  INTEGUMENTARY/SKIN: NEGATIVE for worrisome rashes, moles or lesions  EYES: NEGATIVE for vision changes or irritation  ENT: NEGATIVE for ear, mouth and throat problems  RESP: NEGATIVE for significant cough or SOB  CV: NEGATIVE for chest pain, palpitations or peripheral edema  GI: NEGATIVE for nausea, abdominal pain, heartburn, or change in bowel habits   male: negative for dysuria, hematuria, decreased urinary stream, erectile dysfunction, urethral discharge  MUSCULOSKELETAL: NEGATIVE for significant arthralgias or myalgia  NEURO: NEGATIVE for weakness, dizziness or paresthesias  PSYCHIATRIC: NEGATIVE for changes in mood or affect    OBJECTIVE:   /62   Pulse 52   Temp 98  F (36.7  C)   Resp 16   Ht 1.765 m (5' 9.5\")   Wt 87.1 kg (192 lb)   SpO2 98%   BMI 27.95 kg/m      Physical Exam  GENERAL: healthy, alert and no distress  NECK: no adenopathy, no asymmetry, masses, or scars and thyroid normal to palpation  RESP: lungs clear to auscultation - no rales, rhonchi or wheezes  CV: regular rate and rhythm, normal S1 S2, no S3 or S4, no murmur, click or rub, no peripheral edema and peripheral pulses strong  ABDOMEN: soft, nontender, no hepatosplenomegaly, no masses and bowel sounds normal  MS: no gross musculoskeletal defects noted, no edema    Diagnostic Test Results:  Labs " reviewed in Epic    ASSESSMENT/PLAN:     1. Annual physical exam  Generally healthy 23-year-old male.  Graduated from [employer] last year, was an athlete-played lacrosse.  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  Considering a promotion    He is fasting today so we will check a lipid profile    2. Recurrent cold sores  Valtrex works for him as needed    3. Screening for diabetes mellitus  - Hemoglobin A1c; Future    4. Marijuana user  Usually smokes a small amount of cannabis every day after work.  We talked about possibly transitioning to edibles.    He does not use alcohol on a regular basis.    Patient Instructions   Blood pressure and other vital signs look good.    Weight looks good.    Lab work today.    Flu shot.    Follow-up in 1 to 2 years, sooner if needed        Patient has been advised of split billing requirements and indicates understanding: Yes      COUNSELING:   Reviewed preventive health counseling, as reflected in patient instructions       Regular exercise       Healthy diet/nutrition       Vision screening        He reports that he has quit smoking. His smokeless tobacco use includes chew.            Jaycob Godinez, [facility]

## 2024-10-10 ENCOUNTER — TRANSFERRED RECORDS (OUTPATIENT)
Dept: HEALTH INFORMATION MANAGEMENT | Facility: CLINIC | Age: 24
End: 2024-10-10
Payer: COMMERCIAL

## 2024-11-06 ENCOUNTER — OFFICE VISIT (OUTPATIENT)
Dept: INTERNAL MEDICINE | Facility: CLINIC | Age: 24
End: 2024-11-06
Payer: COMMERCIAL

## 2024-11-06 VITALS
HEART RATE: 63 BPM | HEIGHT: 70 IN | OXYGEN SATURATION: 99 % | DIASTOLIC BLOOD PRESSURE: 60 MMHG | BODY MASS INDEX: 28.06 KG/M2 | SYSTOLIC BLOOD PRESSURE: 108 MMHG | TEMPERATURE: 98.4 F | WEIGHT: 196 LBS | RESPIRATION RATE: 14 BRPM

## 2024-11-06 DIAGNOSIS — Z00.00 ANNUAL PHYSICAL EXAM: ICD-10-CM

## 2024-11-06 DIAGNOSIS — Z11.3 SCREENING EXAMINATION FOR STI: ICD-10-CM

## 2024-11-06 DIAGNOSIS — Z13.1 SCREENING FOR DIABETES MELLITUS: ICD-10-CM

## 2024-11-06 DIAGNOSIS — B00.1 RECURRENT COLD SORES: Primary | ICD-10-CM

## 2024-11-06 DIAGNOSIS — Z13.220 SCREENING FOR LIPOID DISORDERS: ICD-10-CM

## 2024-11-06 DIAGNOSIS — F12.90 MARIJUANA USER: ICD-10-CM

## 2024-11-06 DIAGNOSIS — L70.9 ACNE, UNSPECIFIED ACNE TYPE: ICD-10-CM

## 2024-11-06 LAB
ALBUMIN SERPL BCG-MCNC: 4.8 G/DL (ref 3.5–5.2)
ALP SERPL-CCNC: 67 U/L (ref 40–150)
ALT SERPL W P-5'-P-CCNC: 34 U/L (ref 0–70)
ANION GAP SERPL CALCULATED.3IONS-SCNC: 10 MMOL/L (ref 7–15)
AST SERPL W P-5'-P-CCNC: 28 U/L (ref 0–45)
BILIRUB SERPL-MCNC: 0.4 MG/DL
BUN SERPL-MCNC: 14.9 MG/DL (ref 6–20)
C TRACH DNA SPEC QL NAA+PROBE: NEGATIVE
CALCIUM SERPL-MCNC: 9.7 MG/DL (ref 8.8–10.4)
CHLORIDE SERPL-SCNC: 101 MMOL/L (ref 98–107)
CHOLEST SERPL-MCNC: 174 MG/DL
CREAT SERPL-MCNC: 1.04 MG/DL (ref 0.67–1.17)
EGFRCR SERPLBLD CKD-EPI 2021: >90 ML/MIN/1.73M2
EST. AVERAGE GLUCOSE BLD GHB EST-MCNC: 108 MG/DL
FASTING STATUS PATIENT QL REPORTED: YES
FASTING STATUS PATIENT QL REPORTED: YES
GLUCOSE SERPL-MCNC: 101 MG/DL (ref 70–99)
HBA1C MFR BLD: 5.4 % (ref 0–5.6)
HBV SURFACE AG SERPL QL IA: NONREACTIVE
HCO3 SERPL-SCNC: 27 MMOL/L (ref 22–29)
HDLC SERPL-MCNC: 54 MG/DL
HIV 1+2 AB+HIV1 P24 AG SERPL QL IA: NONREACTIVE
LDLC SERPL CALC-MCNC: 114 MG/DL
N GONORRHOEA DNA SPEC QL NAA+PROBE: NEGATIVE
NONHDLC SERPL-MCNC: 120 MG/DL
POTASSIUM SERPL-SCNC: 4.8 MMOL/L (ref 3.4–5.3)
PROT SERPL-MCNC: 7.7 G/DL (ref 6.4–8.3)
SODIUM SERPL-SCNC: 138 MMOL/L (ref 135–145)
T PALLIDUM AB SER QL: NONREACTIVE
TRIGL SERPL-MCNC: 30 MG/DL

## 2024-11-06 PROCEDURE — 36415 COLL VENOUS BLD VENIPUNCTURE: CPT | Performed by: NURSE PRACTITIONER

## 2024-11-06 PROCEDURE — 99395 PREV VISIT EST AGE 18-39: CPT | Mod: 25 | Performed by: NURSE PRACTITIONER

## 2024-11-06 PROCEDURE — 80061 LIPID PANEL: CPT | Performed by: NURSE PRACTITIONER

## 2024-11-06 PROCEDURE — 87591 N.GONORRHOEAE DNA AMP PROB: CPT | Performed by: NURSE PRACTITIONER

## 2024-11-06 PROCEDURE — 87491 CHLMYD TRACH DNA AMP PROBE: CPT | Performed by: NURSE PRACTITIONER

## 2024-11-06 PROCEDURE — 87389 HIV-1 AG W/HIV-1&-2 AB AG IA: CPT | Performed by: NURSE PRACTITIONER

## 2024-11-06 PROCEDURE — 83036 HEMOGLOBIN GLYCOSYLATED A1C: CPT | Performed by: NURSE PRACTITIONER

## 2024-11-06 PROCEDURE — 86780 TREPONEMA PALLIDUM: CPT | Performed by: NURSE PRACTITIONER

## 2024-11-06 PROCEDURE — 80053 COMPREHEN METABOLIC PANEL: CPT | Performed by: NURSE PRACTITIONER

## 2024-11-06 PROCEDURE — 87522 HEPATITIS C REVRS TRNSCRPJ: CPT | Performed by: NURSE PRACTITIONER

## 2024-11-06 PROCEDURE — 87340 HEPATITIS B SURFACE AG IA: CPT | Performed by: NURSE PRACTITIONER

## 2024-11-06 RX ORDER — CLINDAMYCIN PHOSPHATE 10 MG/G
GEL TOPICAL 2 TIMES DAILY
COMMUNITY
Start: 2024-10-10

## 2024-11-06 RX ORDER — DOXYCYCLINE 100 MG/1
100 CAPSULE ORAL 2 TIMES DAILY
COMMUNITY
Start: 2024-10-10

## 2024-11-06 RX ORDER — ADAPALENE GEL USP, 0.3% 3 MG/G
GEL TOPICAL AT BEDTIME
COMMUNITY
Start: 2024-10-10

## 2024-11-06 SDOH — HEALTH STABILITY: PHYSICAL HEALTH: ON AVERAGE, HOW MANY MINUTES DO YOU ENGAGE IN EXERCISE AT THIS LEVEL?: 30 MIN

## 2024-11-06 SDOH — HEALTH STABILITY: PHYSICAL HEALTH: ON AVERAGE, HOW MANY DAYS PER WEEK DO YOU ENGAGE IN MODERATE TO STRENUOUS EXERCISE (LIKE A BRISK WALK)?: 3 DAYS

## 2024-11-06 ASSESSMENT — SOCIAL DETERMINANTS OF HEALTH (SDOH): HOW OFTEN DO YOU GET TOGETHER WITH FRIENDS OR RELATIVES?: MORE THAN THREE TIMES A WEEK

## 2024-11-06 NOTE — PATIENT INSTRUCTIONS
We will check some lab work today to screen for STIs.    Diabetes and cholesterol labs as well today.    No changes to prescription medications.    Follow-up in 1 year, sooner if needed

## 2024-11-06 NOTE — PROGRESS NOTES
"Preventive Care Visit  [facility]  Jaycob Godinez, CNP, Nurse Practitioner - Family  Nov 6, 2024      Assessment & Plan     Annual physical exam  Larry continues to do well.  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  Some mild anxiety while at work but says outside of work his mood is good.    Mild to moderate alcohol consumption.  Still smoking cannabis occasionally  - Comprehensive metabolic panel; Future    Recurrent cold sores  Uses Valtrex as needed with great results    Acne  He saw dermatology consultants this past fall was started on doxycycline twice daily and appears to be tolerating that medication well, also using topical clindamycin    Marijuana user      Screening for diabetes mellitus  - Hemoglobin A1c; Future    Screening for lipoid disorders  - Lipid Profile (Chol, Trig, HDL, LDL calc); Future    Screening examination for STI  - Chlamydia trachomatis PCR; Future  - Neisseria gonorrhoeae PCR; Future  - Hepatitis B surface antigen; Future  - Hepatitis C RNA, Quantitative by PCR with Confirmatory Reflex to Genotyping; Future  - HIV Antigen Antibody Combo Cascade; Future  - Treponema Abs w Reflex to RPR and Titer; Future    Patient Instructions   We will check some lab work today to screen for STIs.    Diabetes and cholesterol labs as well today.    No changes to prescription medications.    Follow-up in 1 year, sooner if needed      Patient has been advised of split billing requirements and indicates understanding: Yes        BMI  Estimated body mass index is 28.53 kg/m  as calculated from the following:    Height as of this encounter: 1.765 m (5' 9.5\").    Weight as of this encounter: 88.9 kg (196 lb).       Counseling  Appropriate preventive services were addressed with this patient via screening, questionnaire, or discussion as appropriate for fall prevention, nutrition, physical activity, Tobacco-use cessation, social engagement, weight loss and " cognition.  Checklist reviewing preventive services available has been given to the patient.  Reviewed patient's diet, addressing concerns and/or questions.   He is at risk for lack of exercise and has been provided with information to increase physical activity for the benefit of his well-being.   The patient reports drinking more than 3 alcoholic drinks per day and/or more than 7 drhnks per week. The patient was counseled and given information about possible harmful effects of excessive alcohol intake.        Zoie Garza is a 24 year old, presenting for the following:  Physical (fasting)        11/6/2024     7:00 AM   Additional Questions   Roomed by [facility]  CPE        Health Care Directive  Patient does not have a Health Care Directive: Discussed advance care planning with patient; information given to patient to review.      11/6/2024   General Health   How would you rate your overall physical health? Good   Feel stress (tense, anxious, or unable to sleep) To some extent      (!) STRESS CONCERN      11/6/2024   Nutrition   Three or more servings of calcium each day? Yes   Diet: Regular (no restrictions)   How many servings of fruit and vegetables per day? (!) 0-1   How many sweetened beverages each day? (!) 2            11/6/2024   Exercise   Days per week of moderate/strenous exercise 3 days   Average minutes spent exercising at this level 30 min            11/6/2024   Social Factors   Frequency of gathering with friends or relatives More than three times a week   Worry food won't last until get money to buy more No   Food not last or not have enough money for food? No   Do you have housing? (Housing is defined as stable permanent housing and does not include staying ouside in a car, in a tent, in an abandoned building, in an overnight shelter, or couch-surfing.) No   Are you worried about losing your housing? No   Lack of transportation? No   Unable to get utilities (heat,electricity)? No    Want help with housing or utility concern? No      (!) HOUSING CONCERN PRESENT      11/6/2024   Dental   Dentist two times every year? Yes            11/6/2024   TB Screening   Were you born outside of the [place]? No            Today's PHQ-2 Score:       11/6/2024     6:54 AM   PHQ-2 ( 1999 Pfizer)   Q1: Little interest or pleasure in doing things 0    Q2: Feeling down, depressed or hopeless 0    PHQ-2 Score 0    Q1: Little interest or pleasure in doing things Not at all   Q2: Feeling down, depressed or hopeless Not at all   PHQ-2 Score 0       Patient-reported           11/6/2024   Substance Use   Alcohol more than 3/day or more than 7/wk Yes   How often do you have a drink containing alcohol 2 to 3 times a week   How many alcohol drinks on typical day 3 or 4   How often do you have 5+ drinks at one occasion Monthly   Audit 2/3 Score 3   How often not able to stop drinking once started Never   How often failed to do what normally expected Never   How often needed first drink in am after a heavy drinking session Never   How often feeling of guilt or remorse after drinking Never   How often unable to remember what happened the night before Less than monthly   Have you or someone else been injured because of your drinking No   Has anyone been concerned or suggested you cut down on drinking No   TOTAL SCORE - AUDIT 7   Do you use any other substances recreationally? (!) CANNABIS PRODUCTS        Social History     Tobacco Use    Smoking status: Former     Passive exposure: Past    Smokeless tobacco: Current     Types: Chew    Tobacco comments:     Uses a vaporizer   Vaping Use    Vaping status: Never Used   Substance Use Topics    Alcohol use: No    Drug use: Yes     Types: Marijuana           11/6/2024   STI Screening   New sexual partner(s) since last STI/HIV test? (!) YES             11/6/2024   Contraception/Family Planning   Questions about contraception or family planning No           Reviewed and updated as needed  "this visit by Provider                             Objective    Exam  /60 (BP Location: Right arm, Patient Position: Sitting, Cuff Size: Adult Regular)   Pulse 63   Temp 98.4  F (36.9  C) (Oral)   Resp 14   Ht 1.765 m (5' 9.5\")   Wt 88.9 kg (196 lb)   SpO2 99%   BMI 28.53 kg/m     Estimated body mass index is 28.53 kg/m  as calculated from the following:    Height as of this encounter: 1.765 m (5' 9.5\").    Weight as of this encounter: 88.9 kg (196 lb).    Physical Exam  GENERAL: alert and no distress  NECK: no adenopathy, no asymmetry, masses, or scars  RESP: lungs clear to auscultation - no rales, rhonchi or wheezes  CV: regular rate and rhythm, normal S1 S2, no S3 or S4, no murmur, click or rub, no peripheral edema  ABDOMEN: soft, nontender, no hepatosplenomegaly, no masses and bowel sounds normal  MS: no gross musculoskeletal defects noted, no edema        Signed Electronically by: Jaycob Godinez, CNP    "

## 2024-11-07 LAB — HCV RNA SERPL NAA+PROBE-ACNC: NOT DETECTED IU/ML

## 2025-01-02 ENCOUNTER — OFFICE VISIT (OUTPATIENT)
Dept: URGENT CARE | Facility: URGENT CARE | Age: 25
End: 2025-01-02
Payer: COMMERCIAL

## 2025-01-02 VITALS
OXYGEN SATURATION: 97 % | HEART RATE: 57 BPM | DIASTOLIC BLOOD PRESSURE: 71 MMHG | SYSTOLIC BLOOD PRESSURE: 146 MMHG | RESPIRATION RATE: 16 BRPM | TEMPERATURE: 98.9 F

## 2025-01-02 DIAGNOSIS — H66.90 ACUTE OTITIS MEDIA, UNSPECIFIED OTITIS MEDIA TYPE: Primary | ICD-10-CM

## 2025-01-02 DIAGNOSIS — J06.9 UPPER RESPIRATORY TRACT INFECTION, UNSPECIFIED TYPE: ICD-10-CM

## 2025-01-02 RX ORDER — AMOXICILLIN 875 MG/1
875 TABLET, COATED ORAL 2 TIMES DAILY
Qty: 20 TABLET | Refills: 0 | Status: SHIPPED | OUTPATIENT
Start: 2025-01-02 | End: 2025-01-12

## 2025-01-03 NOTE — PROGRESS NOTES
Assessment & Plan     Acute otitis media, unspecified otitis media type  amox  - amoxicillin (AMOXIL) 875 MG tablet  Dispense: 20 tablet; Refill: 0    Upper respiratory tract infection, unspecified type  Viral URI             No follow-ups on file.    Checo Vicente MD  [facility] ANGELA Garza is a 24 year old male who presents to clinic today for the following health issues:  Chief Complaint   Patient presents with    Sinus Problem     Has sinus congestion, left ear pain  cough some SOB sx's for 5 days  had fever 2 days ago taking OTC meds       HPI    1 week of ear pain left side  Cough  Chest pain  Congestion  OTC measures  Had a fever for few days.        Review of Systems        Objective    BP (!) 146/71   Pulse 57   Temp 98.9  F (37.2  C) (Oral)   Resp 16   SpO2 97%   Physical Exam  Vitals and nursing note reviewed.   Constitutional:       Appearance: Normal appearance.   HENT:      Right Ear: Tympanic membrane and ear canal normal.      Ears:      Comments: Left ear with erythema and exudate     Mouth/Throat:      Mouth: Mucous membranes are moist.   Eyes:      Extraocular Movements: Extraocular movements intact.      Pupils: Pupils are equal, round, and reactive to light.   Cardiovascular:      Rate and Rhythm: Normal rate and regular rhythm.      Pulses: Normal pulses.      Heart sounds: Normal heart sounds.   Pulmonary:      Effort: Pulmonary effort is normal.      Breath sounds: Normal breath sounds.   Musculoskeletal:      Cervical back: Neck supple.   Neurological:      Mental Status: He is alert.

## 2025-05-07 ENCOUNTER — OFFICE VISIT (OUTPATIENT)
Dept: INTERNAL MEDICINE | Facility: CLINIC | Age: 25
End: 2025-05-07
Payer: COMMERCIAL

## 2025-05-07 VITALS
BODY MASS INDEX: 26.77 KG/M2 | OXYGEN SATURATION: 98 % | HEART RATE: 60 BPM | TEMPERATURE: 97.7 F | RESPIRATION RATE: 16 BRPM | HEIGHT: 70 IN | SYSTOLIC BLOOD PRESSURE: 100 MMHG | DIASTOLIC BLOOD PRESSURE: 62 MMHG | WEIGHT: 187 LBS

## 2025-05-07 DIAGNOSIS — Z11.3 SCREENING EXAMINATION FOR STI: Primary | ICD-10-CM

## 2025-05-07 DIAGNOSIS — B00.1 RECURRENT COLD SORES: ICD-10-CM

## 2025-05-07 LAB
C TRACH DNA SPEC QL NAA+PROBE: NEGATIVE
HBV SURFACE AB SERPL IA-ACNC: <3.5 M[IU]/ML
HBV SURFACE AB SERPL IA-ACNC: NONREACTIVE M[IU]/ML
HCV AB SERPL QL IA: NONREACTIVE
HIV 1+2 AB+HIV1 P24 AG SERPL QL IA: NONREACTIVE
N GONORRHOEA DNA SPEC QL NAA+PROBE: NEGATIVE
SPECIMEN TYPE: NORMAL
SPECIMEN TYPE: NORMAL
T PALLIDUM AB SER QL: NONREACTIVE

## 2025-05-07 PROCEDURE — 3074F SYST BP LT 130 MM HG: CPT | Performed by: NURSE PRACTITIONER

## 2025-05-07 PROCEDURE — G2211 COMPLEX E/M VISIT ADD ON: HCPCS | Performed by: NURSE PRACTITIONER

## 2025-05-07 PROCEDURE — 36415 COLL VENOUS BLD VENIPUNCTURE: CPT | Performed by: NURSE PRACTITIONER

## 2025-05-07 PROCEDURE — 86706 HEP B SURFACE ANTIBODY: CPT | Performed by: NURSE PRACTITIONER

## 2025-05-07 PROCEDURE — 3078F DIAST BP <80 MM HG: CPT | Performed by: NURSE PRACTITIONER

## 2025-05-07 PROCEDURE — 87591 N.GONORRHOEAE DNA AMP PROB: CPT | Performed by: NURSE PRACTITIONER

## 2025-05-07 PROCEDURE — 99214 OFFICE O/P EST MOD 30 MIN: CPT | Performed by: NURSE PRACTITIONER

## 2025-05-07 PROCEDURE — 86780 TREPONEMA PALLIDUM: CPT | Performed by: NURSE PRACTITIONER

## 2025-05-07 PROCEDURE — 86803 HEPATITIS C AB TEST: CPT | Performed by: NURSE PRACTITIONER

## 2025-05-07 PROCEDURE — 87389 HIV-1 AG W/HIV-1&-2 AB AG IA: CPT | Performed by: NURSE PRACTITIONER

## 2025-05-07 PROCEDURE — 87491 CHLMYD TRACH DNA AMP PROBE: CPT | Performed by: NURSE PRACTITIONER

## 2025-05-07 RX ORDER — VALACYCLOVIR HYDROCHLORIDE 1 G/1
TABLET, FILM COATED ORAL
Qty: 20 TABLET | Refills: 4 | Status: SHIPPED | OUTPATIENT
Start: 2025-05-07

## 2025-05-07 NOTE — PROGRESS NOTES
"  Assessment & Plan     Screening examination for STI  He is now entering a monogamous relationship.  His partner is requested that the each be screened for STIs.  He is completely asymptomatic  - Hepatitis C antibody; Future  - Hepatitis B Surface Antibody; Future  - HIV Antigen Antibody Combo; Future  - Treponema Abs w Reflex to RPR and Titer; Future  - Chlamydia trachomatis PCR; Future  - Neisseria gonorrhoeae PCR - Clinic Collect    Recurrent cold sores  Valtrex works quite well for him when needed.  He will typically take 1 dose of the Valtrex when he feels or senses a cold sore erupting    Patient Instructions   Repeat STI testing today.  Results come through [employer].    Refills of the valacyclovir provided.    I will see you back in clinic in November      The longitudinal plan of care for the diagnosis(es)/condition(s) as documented were addressed during this visit. Due to the added complexity in care, I will continue to support Greg in the subsequent management and with ongoing continuity of care.            BMI  Estimated body mass index is 27.22 kg/m  as calculated from the following:    Height as of this encounter: 1.765 m (5' 9.5\").    Weight as of this encounter: 84.8 kg (187 lb).             Subjective   Greg is a 25 year old, presenting for the following health issues:  STD      5/7/2025     7:04 AM   Additional Questions   Roomed by      STD    History of Present Illness       Reason for visit:  STD/STI test   He is taking medications regularly.        Here for STI testing.  He is asymptomatic.      Objective    /62 (BP Location: Right arm, Patient Position: Sitting, Cuff Size: Adult Regular)   Pulse 60   Temp 97.7  F (36.5  C) (Oral)   Resp 16   Ht 1.765 m (5' 9.5\")   Wt 84.8 kg (187 lb)   SpO2 98%   BMI 27.22 kg/m    Body mass index is 27.22 kg/m .  Physical Exam   Patient healthy appearing and in no acute distress.              Signed Electronically by: Jaycob Godinez, " CNP

## 2025-05-07 NOTE — PATIENT INSTRUCTIONS
Repeat STI testing today.  Results come through [employer].    Refills of the valacyclovir provided.    I will see you back in clinic in November

## 2025-05-08 ENCOUNTER — RESULTS FOLLOW-UP (OUTPATIENT)
Dept: INTERNAL MEDICINE | Facility: CLINIC | Age: 25
End: 2025-05-08